# Patient Record
Sex: MALE | Race: WHITE | Employment: OTHER | URBAN - METROPOLITAN AREA
[De-identification: names, ages, dates, MRNs, and addresses within clinical notes are randomized per-mention and may not be internally consistent; named-entity substitution may affect disease eponyms.]

---

## 2017-01-01 ENCOUNTER — CARE COORDINATION (OUTPATIENT)
Dept: CASE MANAGEMENT | Age: 77
End: 2017-01-01

## 2017-01-01 ENCOUNTER — NURSE ONLY (OUTPATIENT)
Dept: INTERNAL MEDICINE CLINIC | Age: 77
End: 2017-01-01

## 2017-01-01 ENCOUNTER — OFFICE VISIT (OUTPATIENT)
Dept: ORTHOPEDIC SURGERY | Age: 77
End: 2017-01-01

## 2017-01-01 ENCOUNTER — OFFICE VISIT (OUTPATIENT)
Dept: INTERNAL MEDICINE CLINIC | Age: 77
End: 2017-01-01

## 2017-01-01 ENCOUNTER — TELEPHONE (OUTPATIENT)
Dept: PULMONOLOGY | Age: 77
End: 2017-01-01

## 2017-01-01 ENCOUNTER — HOSPITAL ENCOUNTER (OUTPATIENT)
Dept: PHYSICAL THERAPY | Age: 77
Discharge: HOME | End: 2017-12-28
Admitting: ORTHOPAEDIC SURGERY

## 2017-01-01 ENCOUNTER — HOSPITAL ENCOUNTER (OUTPATIENT)
Dept: PHYSICAL THERAPY | Age: 77
Discharge: HOME | End: 2017-12-26
Admitting: ORTHOPAEDIC SURGERY

## 2017-01-01 ENCOUNTER — HOSPITAL ENCOUNTER (OUTPATIENT)
Dept: OTHER | Age: 77
Discharge: HOME | End: 2017-11-30
Admitting: INTERNAL MEDICINE

## 2017-01-01 ENCOUNTER — HOSPITAL ENCOUNTER (OUTPATIENT)
Dept: OTHER | Age: 77
Discharge: HOME | End: 2017-12-31
Attending: INTERNAL MEDICINE | Admitting: INTERNAL MEDICINE

## 2017-01-01 ENCOUNTER — TELEPHONE (OUTPATIENT)
Dept: INTERNAL MEDICINE CLINIC | Age: 77
End: 2017-01-01

## 2017-01-01 ENCOUNTER — HOSPITAL ENCOUNTER (OUTPATIENT)
Dept: PHYSICAL THERAPY | Age: 77
Discharge: HOME | End: 2017-12-31
Admitting: ORTHOPAEDIC SURGERY

## 2017-01-01 VITALS
HEART RATE: 64 BPM | SYSTOLIC BLOOD PRESSURE: 142 MMHG | BODY MASS INDEX: 39.07 KG/M2 | HEIGHT: 70 IN | WEIGHT: 272.93 LBS | DIASTOLIC BLOOD PRESSURE: 58 MMHG

## 2017-01-01 VITALS
HEIGHT: 70 IN | HEART RATE: 70 BPM | DIASTOLIC BLOOD PRESSURE: 75 MMHG | BODY MASS INDEX: 37.8 KG/M2 | RESPIRATION RATE: 18 BRPM | WEIGHT: 264 LBS | SYSTOLIC BLOOD PRESSURE: 160 MMHG

## 2017-01-01 VITALS
DIASTOLIC BLOOD PRESSURE: 76 MMHG | HEART RATE: 74 BPM | SYSTOLIC BLOOD PRESSURE: 106 MMHG | WEIGHT: 273.37 LBS | BODY MASS INDEX: 39.14 KG/M2 | HEIGHT: 70 IN

## 2017-01-01 VITALS
HEART RATE: 59 BPM | DIASTOLIC BLOOD PRESSURE: 65 MMHG | SYSTOLIC BLOOD PRESSURE: 168 MMHG | BODY MASS INDEX: 39.08 KG/M2 | TEMPERATURE: 98 F | WEIGHT: 273 LBS | HEIGHT: 70 IN | RESPIRATION RATE: 20 BRPM

## 2017-01-01 VITALS
RESPIRATION RATE: 18 BRPM | SYSTOLIC BLOOD PRESSURE: 150 MMHG | HEART RATE: 70 BPM | DIASTOLIC BLOOD PRESSURE: 50 MMHG | HEIGHT: 70 IN

## 2017-01-01 VITALS — HEART RATE: 66 BPM | SYSTOLIC BLOOD PRESSURE: 165 MMHG | DIASTOLIC BLOOD PRESSURE: 55 MMHG

## 2017-01-01 DIAGNOSIS — D63.1 ANEMIA OF CHRONIC RENAL FAILURE, STAGE 3 (MODERATE) (HCC): ICD-10-CM

## 2017-01-01 DIAGNOSIS — D47.2 MGUS (MONOCLONAL GAMMOPATHY OF UNKNOWN SIGNIFICANCE): ICD-10-CM

## 2017-01-01 DIAGNOSIS — S72.002D FRACTURE, PROXIMAL FEMUR, LEFT, CLOSED, WITH ROUTINE HEALING, SUBSEQUENT ENCOUNTER: ICD-10-CM

## 2017-01-01 DIAGNOSIS — L57.0 ACTINIC KERATOSIS: Primary | ICD-10-CM

## 2017-01-01 DIAGNOSIS — G47.33 OSA (OBSTRUCTIVE SLEEP APNEA): ICD-10-CM

## 2017-01-01 DIAGNOSIS — N18.4 CHRONIC KIDNEY DISEASE (CKD), STAGE IV (SEVERE) (HCC): ICD-10-CM

## 2017-01-01 DIAGNOSIS — D63.8 ANEMIA IN OTHER CHRONIC DISEASES CLASSIFIED ELSEWHERE: ICD-10-CM

## 2017-01-01 DIAGNOSIS — S72.402D CLOSED FRACTURE OF DISTAL END OF LEFT FEMUR WITH ROUTINE HEALING, UNSPECIFIED FRACTURE MORPHOLOGY, SUBSEQUENT ENCOUNTER: Primary | ICD-10-CM

## 2017-01-01 DIAGNOSIS — S72.142A CLOSED INTERTROCHANTERIC FRACTURE OF HIP, LEFT, INITIAL ENCOUNTER (HCC): ICD-10-CM

## 2017-01-01 DIAGNOSIS — I25.10 CORONARY ARTERY DISEASE INVOLVING NATIVE CORONARY ARTERY OF NATIVE HEART WITHOUT ANGINA PECTORIS: ICD-10-CM

## 2017-01-01 DIAGNOSIS — K20.90 ESOPHAGITIS: ICD-10-CM

## 2017-01-01 DIAGNOSIS — M51.26 DISPLACEMENT OF LUMBAR INTERVERTEBRAL DISC WITHOUT MYELOPATHY: Chronic | ICD-10-CM

## 2017-01-01 DIAGNOSIS — N18.30 ANEMIA OF CHRONIC RENAL FAILURE, STAGE 3 (MODERATE) (HCC): ICD-10-CM

## 2017-01-01 DIAGNOSIS — M25.552 HIP PAIN, LEFT: Primary | ICD-10-CM

## 2017-01-01 DIAGNOSIS — I15.0 RENOVASCULAR HYPERTENSION: ICD-10-CM

## 2017-01-01 DIAGNOSIS — E11.29 TYPE 2 DIABETES MELLITUS WITH OTHER DIABETIC KIDNEY COMPLICATION, WITH LONG-TERM CURRENT USE OF INSULIN (HCC): ICD-10-CM

## 2017-01-01 DIAGNOSIS — I50.32 CHRONIC DIASTOLIC CONGESTIVE HEART FAILURE (HCC): ICD-10-CM

## 2017-01-01 DIAGNOSIS — Z79.4 TYPE 2 DIABETES MELLITUS WITH OTHER DIABETIC KIDNEY COMPLICATION, WITH LONG-TERM CURRENT USE OF INSULIN (HCC): ICD-10-CM

## 2017-01-01 DIAGNOSIS — M25.552 PAIN OF LEFT HIP JOINT: Primary | ICD-10-CM

## 2017-01-01 DIAGNOSIS — R53.82 CHRONIC FATIGUE: ICD-10-CM

## 2017-01-01 DIAGNOSIS — Z23 NEED FOR INFLUENZA VACCINATION: Primary | ICD-10-CM

## 2017-01-01 DIAGNOSIS — N18.30 CHRONIC KIDNEY DISEASE, STAGE III (MODERATE) (HCC): ICD-10-CM

## 2017-01-01 DIAGNOSIS — S72.402A CLOSED FRACTURE OF DISTAL END OF LEFT FEMUR, UNSPECIFIED FRACTURE MORPHOLOGY, INITIAL ENCOUNTER (HCC): Primary | ICD-10-CM

## 2017-01-01 DIAGNOSIS — E66.09 CLASS 2 OBESITY DUE TO EXCESS CALORIES WITH BODY MASS INDEX (BMI) OF 39.0 TO 39.9 IN ADULT, UNSPECIFIED WHETHER SERIOUS COMORBIDITY PRESENT: ICD-10-CM

## 2017-01-01 DIAGNOSIS — S72.402D CLOSED FRACTURE OF DISTAL END OF LEFT FEMUR WITH ROUTINE HEALING, UNSPECIFIED FRACTURE MORPHOLOGY, SUBSEQUENT ENCOUNTER: ICD-10-CM

## 2017-01-01 LAB
ABO/RH: NORMAL
ANTIBODY SCREEN: NORMAL
BLOOD BANK DISPENSE STATUS: NORMAL
BLOOD BANK DISPENSE STATUS: NORMAL
BLOOD BANK PRODUCT CODE: NORMAL
BLOOD BANK PRODUCT CODE: NORMAL
BPU ID: NORMAL
BPU ID: NORMAL
DESCRIPTION BLOOD BANK: NORMAL
DESCRIPTION BLOOD BANK: NORMAL

## 2017-01-01 PROCEDURE — G8484 FLU IMMUNIZE NO ADMIN: HCPCS | Performed by: INTERNAL MEDICINE

## 2017-01-01 PROCEDURE — 73502 X-RAY EXAM HIP UNI 2-3 VIEWS: CPT | Performed by: ORTHOPAEDIC SURGERY

## 2017-01-01 PROCEDURE — 99214 OFFICE O/P EST MOD 30 MIN: CPT | Performed by: INTERNAL MEDICINE

## 2017-01-01 PROCEDURE — 1111F DSCHRG MED/CURRENT MED MERGE: CPT | Performed by: PHYSICIAN ASSISTANT

## 2017-01-01 PROCEDURE — G0008 ADMIN INFLUENZA VIRUS VAC: HCPCS | Performed by: INTERNAL MEDICINE

## 2017-01-01 PROCEDURE — 4040F PNEUMOC VAC/ADMIN/RCVD: CPT | Performed by: INTERNAL MEDICINE

## 2017-01-01 PROCEDURE — G8417 CALC BMI ABV UP PARAM F/U: HCPCS | Performed by: PHYSICIAN ASSISTANT

## 2017-01-01 PROCEDURE — 99214 OFFICE O/P EST MOD 30 MIN: CPT | Performed by: PHYSICIAN ASSISTANT

## 2017-01-01 PROCEDURE — 73552 X-RAY EXAM OF FEMUR 2/>: CPT | Performed by: ORTHOPAEDIC SURGERY

## 2017-01-01 PROCEDURE — G8428 CUR MEDS NOT DOCUMENT: HCPCS | Performed by: PHYSICIAN ASSISTANT

## 2017-01-01 PROCEDURE — 99024 POSTOP FOLLOW-UP VISIT: CPT | Performed by: ORTHOPAEDIC SURGERY

## 2017-01-01 PROCEDURE — 1123F ACP DISCUSS/DSCN MKR DOCD: CPT | Performed by: INTERNAL MEDICINE

## 2017-01-01 PROCEDURE — 1123F ACP DISCUSS/DSCN MKR DOCD: CPT | Performed by: PHYSICIAN ASSISTANT

## 2017-01-01 PROCEDURE — G8598 ASA/ANTIPLAT THER USED: HCPCS | Performed by: INTERNAL MEDICINE

## 2017-01-01 PROCEDURE — 4040F PNEUMOC VAC/ADMIN/RCVD: CPT | Performed by: PHYSICIAN ASSISTANT

## 2017-01-01 PROCEDURE — G8484 FLU IMMUNIZE NO ADMIN: HCPCS | Performed by: PHYSICIAN ASSISTANT

## 2017-01-01 PROCEDURE — G8428 CUR MEDS NOT DOCUMENT: HCPCS | Performed by: INTERNAL MEDICINE

## 2017-01-01 PROCEDURE — 73560 X-RAY EXAM OF KNEE 1 OR 2: CPT | Performed by: ORTHOPAEDIC SURGERY

## 2017-01-01 PROCEDURE — 99212 OFFICE O/P EST SF 10 MIN: CPT | Performed by: INTERNAL MEDICINE

## 2017-01-01 PROCEDURE — G8417 CALC BMI ABV UP PARAM F/U: HCPCS | Performed by: INTERNAL MEDICINE

## 2017-01-01 PROCEDURE — G8598 ASA/ANTIPLAT THER USED: HCPCS | Performed by: PHYSICIAN ASSISTANT

## 2017-01-01 PROCEDURE — 1036F TOBACCO NON-USER: CPT | Performed by: PHYSICIAN ASSISTANT

## 2017-01-01 PROCEDURE — 1036F TOBACCO NON-USER: CPT | Performed by: INTERNAL MEDICINE

## 2017-01-01 PROCEDURE — 90662 IIV NO PRSV INCREASED AG IM: CPT | Performed by: INTERNAL MEDICINE

## 2017-01-01 RX ORDER — 0.9 % SODIUM CHLORIDE 0.9 %
250 INTRAVENOUS SOLUTION INTRAVENOUS ONCE
Status: COMPLETED | OUTPATIENT
Start: 2017-01-01 | End: 2017-01-01

## 2017-01-01 RX ORDER — BLOOD-GLUCOSE METER
EACH MISCELLANEOUS
Qty: 1 KIT | Refills: 0 | Status: SHIPPED | OUTPATIENT
Start: 2017-01-01

## 2017-01-01 RX ORDER — HYDRALAZINE HYDROCHLORIDE 100 MG/1
TABLET, FILM COATED ORAL
Qty: 270 TABLET | Refills: 0 | Status: SHIPPED | OUTPATIENT
Start: 2017-01-01 | End: 2018-01-01 | Stop reason: SDUPTHER

## 2017-01-01 RX ORDER — ALLOPURINOL 100 MG/1
TABLET ORAL
Qty: 90 TABLET | Refills: 0 | Status: SHIPPED | OUTPATIENT
Start: 2017-01-01 | End: 2018-01-01 | Stop reason: SDUPTHER

## 2017-01-01 RX ORDER — DIPHENHYDRAMINE HCL 25 MG
25 TABLET ORAL ONCE
Status: COMPLETED | OUTPATIENT
Start: 2017-01-01 | End: 2017-01-01

## 2017-01-01 RX ORDER — METOLAZONE 2.5 MG/1
2.5 TABLET ORAL
COMMUNITY

## 2017-01-01 RX ORDER — HYDROCODONE BITARTRATE AND ACETAMINOPHEN 5; 325 MG/1; MG/1
1 TABLET ORAL EVERY 6 HOURS PRN
Qty: 120 TABLET | Refills: 0 | Status: ON HOLD | OUTPATIENT
Start: 2017-01-01 | End: 2017-01-01 | Stop reason: HOSPADM

## 2017-01-01 RX ORDER — ISOSORBIDE MONONITRATE 120 MG/1
120 TABLET, EXTENDED RELEASE ORAL DAILY
Qty: 30 TABLET | Refills: 2 | Status: SHIPPED | OUTPATIENT
Start: 2017-01-01

## 2017-01-01 RX ORDER — BENAZEPRIL HYDROCHLORIDE 40 MG/1
TABLET, FILM COATED ORAL
Qty: 90 TABLET | Refills: 0 | Status: ON HOLD | OUTPATIENT
Start: 2017-01-01 | End: 2017-01-01 | Stop reason: HOSPADM

## 2017-01-01 RX ORDER — HUMAN INSULIN 100 [USP'U]/ML
INJECTION, SUSPENSION SUBCUTANEOUS
Qty: 30 ML | Refills: 3 | Status: SHIPPED | OUTPATIENT
Start: 2017-01-01 | End: 2018-01-01 | Stop reason: SDUPTHER

## 2017-01-01 RX ORDER — SODIUM CHLORIDE 0.9 % (FLUSH) 0.9 %
10 SYRINGE (ML) INJECTION PRN
Status: DISCONTINUED | OUTPATIENT
Start: 2017-01-01 | End: 2017-01-01 | Stop reason: CLARIF

## 2017-01-01 RX ORDER — GABAPENTIN 300 MG/1
CAPSULE ORAL
Qty: 540 CAPSULE | Refills: 0 | Status: SHIPPED | OUTPATIENT
Start: 2017-01-01 | End: 2018-01-01 | Stop reason: SDUPTHER

## 2017-01-01 RX ORDER — GLIMEPIRIDE 2 MG/1
TABLET ORAL
Qty: 360 TABLET | Refills: 0 | Status: SHIPPED | OUTPATIENT
Start: 2017-01-01 | End: 2018-01-01 | Stop reason: SDUPTHER

## 2017-01-01 RX ORDER — NIFEDIPINE 60 MG/1
60 TABLET, FILM COATED, EXTENDED RELEASE ORAL DAILY
Qty: 30 TABLET | Refills: 2 | Status: SHIPPED | OUTPATIENT
Start: 2017-01-01 | End: 2018-01-01 | Stop reason: SDUPTHER

## 2017-01-01 RX ORDER — TORSEMIDE 20 MG/1
TABLET ORAL
Qty: 90 TABLET | Refills: 0 | Status: ON HOLD | OUTPATIENT
Start: 2017-01-01 | End: 2017-01-01 | Stop reason: HOSPADM

## 2017-01-01 RX ORDER — ACETAMINOPHEN 325 MG/1
650 TABLET ORAL ONCE
Status: DISCONTINUED | OUTPATIENT
Start: 2017-01-01 | End: 2017-01-01 | Stop reason: HOSPADM

## 2017-01-01 RX ORDER — HYDROCODONE BITARTRATE AND ACETAMINOPHEN 5; 325 MG/1; MG/1
1 TABLET ORAL EVERY 6 HOURS PRN
Qty: 28 TABLET | Refills: 0 | Status: SHIPPED | OUTPATIENT
Start: 2017-01-01 | End: 2018-01-01 | Stop reason: SDUPTHER

## 2017-01-01 RX ORDER — 0.9 % SODIUM CHLORIDE 0.9 %
250 INTRAVENOUS SOLUTION INTRAVENOUS ONCE
Status: DISCONTINUED | OUTPATIENT
Start: 2017-01-01 | End: 2017-01-01 | Stop reason: CLARIF

## 2017-01-01 RX ORDER — HYDROCODONE BITARTRATE AND ACETAMINOPHEN 5; 325 MG/1; MG/1
1 TABLET ORAL EVERY 6 HOURS PRN
Qty: 28 TABLET | Refills: 0 | Status: SHIPPED | OUTPATIENT
Start: 2017-01-01 | End: 2017-01-01 | Stop reason: SDUPTHER

## 2017-01-01 RX ORDER — BENAZEPRIL HYDROCHLORIDE 40 MG/1
TABLET, FILM COATED ORAL
Qty: 90 TABLET | Refills: 0 | Status: SHIPPED | OUTPATIENT
Start: 2017-01-01 | End: 2017-01-01

## 2017-01-01 RX ORDER — SODIUM CHLORIDE 0.9 % (FLUSH) 0.9 %
10 SYRINGE (ML) INJECTION PRN
Status: DISCONTINUED | OUTPATIENT
Start: 2017-01-01 | End: 2017-01-01 | Stop reason: HOSPADM

## 2017-01-01 RX ORDER — ALLOPURINOL 100 MG/1
TABLET ORAL
Qty: 90 TABLET | Refills: 0 | Status: SHIPPED | OUTPATIENT
Start: 2017-01-01 | End: 2017-01-01 | Stop reason: SDUPTHER

## 2017-01-01 RX ORDER — HYDRALAZINE HYDROCHLORIDE 100 MG/1
TABLET, FILM COATED ORAL
Qty: 270 TABLET | Refills: 0 | Status: SHIPPED | OUTPATIENT
Start: 2017-01-01 | End: 2017-01-01 | Stop reason: SDUPTHER

## 2017-01-01 RX ORDER — LANSOPRAZOLE 15 MG/1
15 CAPSULE, DELAYED RELEASE ORAL DAILY
Qty: 30 CAPSULE | Refills: 1 | Status: SHIPPED | OUTPATIENT
Start: 2017-01-01 | End: 2017-01-01

## 2017-01-01 RX ORDER — LANCETS
EACH MISCELLANEOUS
Qty: 100 EACH | Refills: 3 | Status: SHIPPED | OUTPATIENT
Start: 2017-01-01

## 2017-01-01 RX ORDER — TORSEMIDE 20 MG/1
TABLET ORAL
Qty: 90 TABLET | Refills: 0 | Status: SHIPPED | OUTPATIENT
Start: 2017-01-01

## 2017-01-01 RX ORDER — OMEPRAZOLE 20 MG/1
20 CAPSULE, DELAYED RELEASE ORAL DAILY
COMMUNITY

## 2017-01-01 RX ORDER — TRAMADOL HYDROCHLORIDE 50 MG/1
50 TABLET ORAL EVERY 8 HOURS PRN
Qty: 90 TABLET | Refills: 0 | Status: SHIPPED | OUTPATIENT
Start: 2017-01-01 | End: 2017-01-01

## 2017-01-01 RX ADMIN — Medication 250 ML: at 07:41

## 2017-01-01 RX ADMIN — Medication 25 MG: at 07:38

## 2017-01-01 ASSESSMENT — ENCOUNTER SYMPTOMS
SHORTNESS OF BREATH: 0
COLOR CHANGE: 0
BACK PAIN: 1
CHEST TIGHTNESS: 0
COUGH: 0
RHINORRHEA: 0

## 2017-01-19 RX ORDER — TERAZOSIN 2 MG/1
2 CAPSULE ORAL NIGHTLY
Qty: 7 CAPSULE | Refills: 0 | Status: SHIPPED | OUTPATIENT
Start: 2017-01-19

## 2017-01-20 RX ORDER — GABAPENTIN 300 MG/1
CAPSULE ORAL
Qty: 540 CAPSULE | Refills: 0 | Status: SHIPPED | OUTPATIENT
Start: 2017-01-20 | End: 2017-05-06 | Stop reason: SDUPTHER

## 2017-01-20 RX ORDER — GLIMEPIRIDE 2 MG/1
TABLET ORAL
Qty: 360 TABLET | Refills: 0 | Status: SHIPPED | OUTPATIENT
Start: 2017-01-20 | End: 2017-05-06 | Stop reason: SDUPTHER

## 2017-01-20 RX ORDER — HYDRALAZINE HYDROCHLORIDE 100 MG/1
TABLET, FILM COATED ORAL
Qty: 90 TABLET | Refills: 0 | Status: SHIPPED | OUTPATIENT
Start: 2017-01-20 | End: 2017-03-06 | Stop reason: SDUPTHER

## 2017-01-20 RX ORDER — BENAZEPRIL HYDROCHLORIDE 40 MG/1
TABLET, FILM COATED ORAL
Qty: 90 TABLET | Refills: 0 | Status: SHIPPED | OUTPATIENT
Start: 2017-01-20 | End: 2017-05-06 | Stop reason: SDUPTHER

## 2017-01-20 RX ORDER — TORSEMIDE 20 MG/1
TABLET ORAL
Qty: 90 TABLET | Refills: 0 | Status: SHIPPED | OUTPATIENT
Start: 2017-01-20 | End: 2017-03-24 | Stop reason: SDUPTHER

## 2017-01-31 RX ORDER — HYDROCODONE BITARTRATE AND ACETAMINOPHEN 7.5; 325 MG/1; MG/1
1 TABLET ORAL EVERY 6 HOURS PRN
Qty: 120 TABLET | Refills: 0 | Status: SHIPPED | OUTPATIENT
Start: 2017-01-31 | End: 2017-03-03 | Stop reason: SDUPTHER

## 2017-02-14 ENCOUNTER — TELEPHONE (OUTPATIENT)
Dept: INTERNAL MEDICINE CLINIC | Age: 77
End: 2017-02-14

## 2017-02-14 ENCOUNTER — OFFICE VISIT (OUTPATIENT)
Dept: INTERNAL MEDICINE CLINIC | Age: 77
End: 2017-02-14

## 2017-02-14 VITALS
RESPIRATION RATE: 18 BRPM | HEART RATE: 70 BPM | HEIGHT: 70 IN | SYSTOLIC BLOOD PRESSURE: 155 MMHG | WEIGHT: 274 LBS | DIASTOLIC BLOOD PRESSURE: 60 MMHG | BODY MASS INDEX: 39.22 KG/M2

## 2017-02-14 DIAGNOSIS — R68.2 DRY MOUTH: Primary | ICD-10-CM

## 2017-02-14 DIAGNOSIS — R05.9 COUGH: ICD-10-CM

## 2017-02-14 PROCEDURE — 99213 OFFICE O/P EST LOW 20 MIN: CPT | Performed by: INTERNAL MEDICINE

## 2017-02-14 ASSESSMENT — ENCOUNTER SYMPTOMS
COUGH: 1
RHINORRHEA: 0
COLOR CHANGE: 0
SHORTNESS OF BREATH: 0
BACK PAIN: 1
CHEST TIGHTNESS: 0

## 2017-03-03 RX ORDER — HYDROCODONE BITARTRATE AND ACETAMINOPHEN 7.5; 325 MG/1; MG/1
1 TABLET ORAL EVERY 6 HOURS PRN
Qty: 120 TABLET | Refills: 0 | Status: SHIPPED | OUTPATIENT
Start: 2017-03-03 | End: 2017-04-03 | Stop reason: SDUPTHER

## 2017-03-06 RX ORDER — HYDRALAZINE HYDROCHLORIDE 100 MG/1
TABLET, FILM COATED ORAL
Qty: 90 TABLET | Refills: 0 | Status: SHIPPED | OUTPATIENT
Start: 2017-03-06 | End: 2017-03-15 | Stop reason: SDUPTHER

## 2017-03-13 ENCOUNTER — OFFICE VISIT (OUTPATIENT)
Dept: INTERNAL MEDICINE CLINIC | Age: 77
End: 2017-03-13

## 2017-03-13 VITALS
HEART RATE: 60 BPM | RESPIRATION RATE: 18 BRPM | DIASTOLIC BLOOD PRESSURE: 75 MMHG | HEIGHT: 70 IN | SYSTOLIC BLOOD PRESSURE: 160 MMHG | BODY MASS INDEX: 38.37 KG/M2 | WEIGHT: 268 LBS

## 2017-03-13 DIAGNOSIS — Z79.4 TYPE 2 DIABETES MELLITUS WITH OTHER DIABETIC KIDNEY COMPLICATION, WITH LONG-TERM CURRENT USE OF INSULIN (HCC): Primary | ICD-10-CM

## 2017-03-13 DIAGNOSIS — G47.33 OSA (OBSTRUCTIVE SLEEP APNEA): ICD-10-CM

## 2017-03-13 DIAGNOSIS — M47.817 LUMBOSACRAL SPONDYLOSIS WITHOUT MYELOPATHY: Chronic | ICD-10-CM

## 2017-03-13 DIAGNOSIS — E11.29 TYPE 2 DIABETES MELLITUS WITH OTHER DIABETIC KIDNEY COMPLICATION, WITH LONG-TERM CURRENT USE OF INSULIN (HCC): Primary | ICD-10-CM

## 2017-03-13 DIAGNOSIS — K21.9 GASTROESOPHAGEAL REFLUX DISEASE WITHOUT ESOPHAGITIS: ICD-10-CM

## 2017-03-13 DIAGNOSIS — E11.40 DIABETIC NEUROPATHY WITH NEUROLOGIC COMPLICATION (HCC): ICD-10-CM

## 2017-03-13 DIAGNOSIS — I10 ESSENTIAL HYPERTENSION: ICD-10-CM

## 2017-03-13 DIAGNOSIS — E11.49 DIABETIC NEUROPATHY WITH NEUROLOGIC COMPLICATION (HCC): ICD-10-CM

## 2017-03-13 DIAGNOSIS — I25.10 CORONARY ARTERY DISEASE INVOLVING NATIVE HEART WITHOUT ANGINA PECTORIS, UNSPECIFIED VESSEL OR LESION TYPE: ICD-10-CM

## 2017-03-13 PROCEDURE — 99214 OFFICE O/P EST MOD 30 MIN: CPT | Performed by: INTERNAL MEDICINE

## 2017-03-13 ASSESSMENT — ENCOUNTER SYMPTOMS
RHINORRHEA: 0
COUGH: 0
COLOR CHANGE: 0
BACK PAIN: 1
CHEST TIGHTNESS: 0
SHORTNESS OF BREATH: 0

## 2017-03-15 RX ORDER — HYDRALAZINE HYDROCHLORIDE 100 MG/1
100 TABLET, FILM COATED ORAL 3 TIMES DAILY
Qty: 270 TABLET | Refills: 0 | Status: SHIPPED | OUTPATIENT
Start: 2017-03-15 | End: 2017-06-01 | Stop reason: SDUPTHER

## 2017-03-17 ENCOUNTER — TELEPHONE (OUTPATIENT)
Dept: PULMONOLOGY | Age: 77
End: 2017-03-17

## 2017-03-17 RX ORDER — SYRINGE-NEEDLE,INSULIN,0.5 ML 31 GX5/16"
SYRINGE, EMPTY DISPOSABLE MISCELLANEOUS
Qty: 270 EACH | Refills: 5 | Status: SHIPPED | OUTPATIENT
Start: 2017-03-17 | End: 2018-01-01 | Stop reason: SDUPTHER

## 2017-03-21 ENCOUNTER — TELEPHONE (OUTPATIENT)
Dept: INTERNAL MEDICINE CLINIC | Age: 77
End: 2017-03-21

## 2017-03-21 ENCOUNTER — OFFICE VISIT (OUTPATIENT)
Dept: PULMONOLOGY | Age: 77
End: 2017-03-21

## 2017-03-21 ENCOUNTER — TELEPHONE (OUTPATIENT)
Dept: PULMONOLOGY | Age: 77
End: 2017-03-21

## 2017-03-21 VITALS
OXYGEN SATURATION: 97 % | HEART RATE: 67 BPM | RESPIRATION RATE: 22 BRPM | DIASTOLIC BLOOD PRESSURE: 66 MMHG | BODY MASS INDEX: 38.02 KG/M2 | TEMPERATURE: 98.1 F | HEIGHT: 70 IN | SYSTOLIC BLOOD PRESSURE: 174 MMHG | WEIGHT: 265.6 LBS

## 2017-03-21 DIAGNOSIS — G47.33 OSA (OBSTRUCTIVE SLEEP APNEA): ICD-10-CM

## 2017-03-21 DIAGNOSIS — J47.9 BRONCHIECTASIS WITHOUT COMPLICATION (HCC): ICD-10-CM

## 2017-03-21 DIAGNOSIS — R93.89 ABNORMAL CT SCAN, CHEST: Primary | ICD-10-CM

## 2017-03-21 PROCEDURE — 99214 OFFICE O/P EST MOD 30 MIN: CPT | Performed by: INTERNAL MEDICINE

## 2017-03-21 ASSESSMENT — SLEEP AND FATIGUE QUESTIONNAIRES
HOW LIKELY ARE YOU TO NOD OFF OR FALL ASLEEP WHILE LYING DOWN TO REST IN THE AFTERNOON WHEN CIRCUMSTANCES PERMIT: 2
HOW LIKELY ARE YOU TO NOD OFF OR FALL ASLEEP WHILE SITTING AND READING: 0
HOW LIKELY ARE YOU TO NOD OFF OR FALL ASLEEP WHILE SITTING AND TALKING TO SOMEONE: 0
HOW LIKELY ARE YOU TO NOD OFF OR FALL ASLEEP WHILE WATCHING TV: 0
HOW LIKELY ARE YOU TO NOD OFF OR FALL ASLEEP WHILE SITTING INACTIVE IN A PUBLIC PLACE: 0
ESS TOTAL SCORE: 2
HOW LIKELY ARE YOU TO NOD OFF OR FALL ASLEEP IN A CAR, WHILE STOPPED FOR A FEW MINUTES IN TRAFFIC: 0
NECK CIRCUMFERENCE (INCHES): 17
HOW LIKELY ARE YOU TO NOD OFF OR FALL ASLEEP WHILE SITTING QUIETLY AFTER LUNCH WITHOUT ALCOHOL: 0
HOW LIKELY ARE YOU TO NOD OFF OR FALL ASLEEP WHEN YOU ARE A PASSENGER IN A CAR FOR AN HOUR WITHOUT A BREAK: 0

## 2017-03-27 RX ORDER — ALLOPURINOL 100 MG/1
TABLET ORAL
Qty: 90 TABLET | Refills: 0 | Status: SHIPPED | OUTPATIENT
Start: 2017-03-27 | End: 2017-05-31 | Stop reason: SDUPTHER

## 2017-03-27 RX ORDER — TORSEMIDE 20 MG/1
TABLET ORAL
Qty: 90 TABLET | Refills: 0 | Status: SHIPPED | OUTPATIENT
Start: 2017-03-27 | End: 2017-04-03 | Stop reason: SDUPTHER

## 2017-04-03 ENCOUNTER — TELEPHONE (OUTPATIENT)
Dept: INTERNAL MEDICINE CLINIC | Age: 77
End: 2017-04-03

## 2017-04-03 RX ORDER — TORSEMIDE 20 MG/1
TABLET ORAL
Qty: 14 TABLET | Refills: 0 | Status: SHIPPED | OUTPATIENT
Start: 2017-04-03 | End: 2017-05-25 | Stop reason: SDUPTHER

## 2017-04-04 RX ORDER — HYDROCODONE BITARTRATE AND ACETAMINOPHEN 7.5; 325 MG/1; MG/1
1 TABLET ORAL EVERY 6 HOURS PRN
Qty: 120 TABLET | Refills: 0 | Status: SHIPPED | OUTPATIENT
Start: 2017-04-04 | End: 2017-05-06

## 2017-04-05 PROBLEM — D63.1 ANEMIA OF CHRONIC RENAL FAILURE: Status: ACTIVE | Noted: 2017-04-05

## 2017-04-05 PROBLEM — N18.9 ANEMIA OF CHRONIC RENAL FAILURE: Status: ACTIVE | Noted: 2017-04-05

## 2017-04-07 ENCOUNTER — HOSPITAL ENCOUNTER (OUTPATIENT)
Dept: CT IMAGING | Age: 77
Discharge: HOME | End: 2017-04-07
Attending: INTERNAL MEDICINE | Admitting: INTERNAL MEDICINE

## 2017-04-07 ENCOUNTER — TELEPHONE (OUTPATIENT)
Dept: PULMONOLOGY | Age: 77
End: 2017-04-07

## 2017-04-07 DIAGNOSIS — R93.89 ABNORMAL FINDINGS ON DIAGNOSTIC IMAGING OF OTHER SPECIFIED BODY STRUCTURES: ICD-10-CM

## 2017-04-07 DIAGNOSIS — R93.89 ABNORMAL CT SCAN, CHEST: ICD-10-CM

## 2017-04-07 DIAGNOSIS — R93.89 ABNORMAL CT SCAN: Primary | ICD-10-CM

## 2017-04-24 ENCOUNTER — TELEPHONE (OUTPATIENT)
Dept: PULMONOLOGY | Age: 77
End: 2017-04-24

## 2017-05-08 ENCOUNTER — CARE COORDINATION (OUTPATIENT)
Dept: CARE COORDINATION | Age: 77
End: 2017-05-08

## 2017-05-08 RX ORDER — GLIMEPIRIDE 2 MG/1
TABLET ORAL
Qty: 360 TABLET | Refills: 0 | Status: SHIPPED | OUTPATIENT
Start: 2017-05-08 | End: 2017-01-01 | Stop reason: SDUPTHER

## 2017-05-08 RX ORDER — BENAZEPRIL HYDROCHLORIDE 40 MG/1
TABLET, FILM COATED ORAL
Qty: 90 TABLET | Refills: 0 | Status: SHIPPED | OUTPATIENT
Start: 2017-05-08 | End: 2017-01-01 | Stop reason: SDUPTHER

## 2017-05-08 RX ORDER — GABAPENTIN 300 MG/1
CAPSULE ORAL
Qty: 540 CAPSULE | Refills: 0 | Status: SHIPPED | OUTPATIENT
Start: 2017-05-08 | End: 2017-01-01 | Stop reason: SDUPTHER

## 2017-05-09 ENCOUNTER — TELEPHONE (OUTPATIENT)
Dept: INTERNAL MEDICINE CLINIC | Age: 77
End: 2017-05-09

## 2017-05-11 RX ORDER — HYDROCODONE BITARTRATE AND ACETAMINOPHEN 5; 325 MG/1; MG/1
1 TABLET ORAL EVERY 6 HOURS PRN
Qty: 120 TABLET | Refills: 0 | Status: SHIPPED | OUTPATIENT
Start: 2017-05-11 | End: 2017-05-11 | Stop reason: CLARIF

## 2017-05-11 RX ORDER — HYDROCODONE BITARTRATE AND ACETAMINOPHEN 5; 325 MG/1; MG/1
1 TABLET ORAL EVERY 6 HOURS PRN
Qty: 120 TABLET | Refills: 0 | Status: SHIPPED | OUTPATIENT
Start: 2017-05-11 | End: 2017-06-22 | Stop reason: SDUPTHER

## 2017-05-25 RX ORDER — TORSEMIDE 20 MG/1
TABLET ORAL
Qty: 90 TABLET | Refills: 0 | Status: SHIPPED | OUTPATIENT
Start: 2017-05-25 | End: 2017-01-01 | Stop reason: SDUPTHER

## 2017-06-01 RX ORDER — HYDRALAZINE HYDROCHLORIDE 100 MG/1
TABLET, FILM COATED ORAL
Qty: 270 TABLET | Refills: 0 | Status: SHIPPED | OUTPATIENT
Start: 2017-06-01 | End: 2017-01-01 | Stop reason: SDUPTHER

## 2017-06-01 RX ORDER — ALLOPURINOL 100 MG/1
TABLET ORAL
Qty: 90 TABLET | Refills: 0 | Status: SHIPPED | OUTPATIENT
Start: 2017-06-01 | End: 2017-01-01 | Stop reason: SDUPTHER

## 2017-06-02 ENCOUNTER — OFFICE VISIT (OUTPATIENT)
Dept: PULMONOLOGY | Age: 77
End: 2017-06-02

## 2017-06-02 ENCOUNTER — TELEPHONE (OUTPATIENT)
Dept: PULMONOLOGY | Age: 77
End: 2017-06-02

## 2017-06-02 VITALS
RESPIRATION RATE: 16 BRPM | TEMPERATURE: 98.7 F | BODY MASS INDEX: 38.8 KG/M2 | SYSTOLIC BLOOD PRESSURE: 164 MMHG | WEIGHT: 271 LBS | OXYGEN SATURATION: 96 % | DIASTOLIC BLOOD PRESSURE: 62 MMHG | HEIGHT: 70 IN | HEART RATE: 61 BPM

## 2017-06-02 DIAGNOSIS — I10 ESSENTIAL HYPERTENSION: ICD-10-CM

## 2017-06-02 DIAGNOSIS — E66.9 OBESITY (BMI 30-39.9): ICD-10-CM

## 2017-06-02 DIAGNOSIS — G47.33 OSA (OBSTRUCTIVE SLEEP APNEA): Primary | ICD-10-CM

## 2017-06-02 PROCEDURE — 99213 OFFICE O/P EST LOW 20 MIN: CPT | Performed by: NURSE PRACTITIONER

## 2017-06-02 ASSESSMENT — SLEEP AND FATIGUE QUESTIONNAIRES
HOW LIKELY ARE YOU TO NOD OFF OR FALL ASLEEP WHILE LYING DOWN TO REST IN THE AFTERNOON WHEN CIRCUMSTANCES PERMIT: 3
HOW LIKELY ARE YOU TO NOD OFF OR FALL ASLEEP WHILE SITTING QUIETLY AFTER LUNCH WITHOUT ALCOHOL: 0
ESS TOTAL SCORE: 5
HOW LIKELY ARE YOU TO NOD OFF OR FALL ASLEEP WHEN YOU ARE A PASSENGER IN A CAR FOR AN HOUR WITHOUT A BREAK: 0
HOW LIKELY ARE YOU TO NOD OFF OR FALL ASLEEP WHILE SITTING AND TALKING TO SOMEONE: 0
HOW LIKELY ARE YOU TO NOD OFF OR FALL ASLEEP WHILE SITTING AND READING: 1
HOW LIKELY ARE YOU TO NOD OFF OR FALL ASLEEP WHILE WATCHING TV: 1
HOW LIKELY ARE YOU TO NOD OFF OR FALL ASLEEP WHILE SITTING INACTIVE IN A PUBLIC PLACE: 0
NECK CIRCUMFERENCE (INCHES): 18
HOW LIKELY ARE YOU TO NOD OFF OR FALL ASLEEP IN A CAR, WHILE STOPPED FOR A FEW MINUTES IN TRAFFIC: 0

## 2017-06-12 ENCOUNTER — OFFICE VISIT (OUTPATIENT)
Dept: INTERNAL MEDICINE CLINIC | Age: 77
End: 2017-06-12

## 2017-06-12 VITALS
HEART RATE: 60 BPM | SYSTOLIC BLOOD PRESSURE: 160 MMHG | BODY MASS INDEX: 37.94 KG/M2 | RESPIRATION RATE: 18 BRPM | HEIGHT: 70 IN | DIASTOLIC BLOOD PRESSURE: 50 MMHG | WEIGHT: 265 LBS

## 2017-06-12 DIAGNOSIS — I25.10 CORONARY ARTERY DISEASE INVOLVING NATIVE HEART WITHOUT ANGINA PECTORIS, UNSPECIFIED VESSEL OR LESION TYPE: ICD-10-CM

## 2017-06-12 DIAGNOSIS — N18.30 CHRONIC KIDNEY DISEASE, STAGE III (MODERATE) (HCC): ICD-10-CM

## 2017-06-12 DIAGNOSIS — I15.9 SECONDARY HYPERTENSION: ICD-10-CM

## 2017-06-12 DIAGNOSIS — M48.061 SPINAL STENOSIS, LUMBAR REGION, WITHOUT NEUROGENIC CLAUDICATION: Chronic | ICD-10-CM

## 2017-06-12 DIAGNOSIS — K21.9 GASTROESOPHAGEAL REFLUX DISEASE WITHOUT ESOPHAGITIS: ICD-10-CM

## 2017-06-12 PROCEDURE — 99214 OFFICE O/P EST MOD 30 MIN: CPT | Performed by: INTERNAL MEDICINE

## 2017-06-12 ASSESSMENT — ENCOUNTER SYMPTOMS
RHINORRHEA: 0
COLOR CHANGE: 0
CHEST TIGHTNESS: 0
SHORTNESS OF BREATH: 0
BACK PAIN: 1
COUGH: 0

## 2017-06-13 DIAGNOSIS — R97.20 ELEVATED PSA: Primary | ICD-10-CM

## 2017-06-13 DIAGNOSIS — R97.20 ELEVATED PSA: ICD-10-CM

## 2017-06-13 LAB
ESTIMATED AVERAGE GLUCOSE: 154.2 MG/DL
HBA1C MFR BLD: 7 %
PROSTATE SPECIFIC ANTIGEN: 0.84 NG/ML (ref 0–4)

## 2017-06-22 RX ORDER — HYDROCODONE BITARTRATE AND ACETAMINOPHEN 5; 325 MG/1; MG/1
1 TABLET ORAL EVERY 6 HOURS PRN
Qty: 120 TABLET | Refills: 0 | Status: SHIPPED | OUTPATIENT
Start: 2017-06-22 | End: 2017-07-31 | Stop reason: SDUPTHER

## 2017-06-29 ENCOUNTER — CARE COORDINATION (OUTPATIENT)
Dept: CARE COORDINATION | Age: 77
End: 2017-06-29

## 2017-07-17 ENCOUNTER — TELEPHONE (OUTPATIENT)
Dept: INTERNAL MEDICINE CLINIC | Age: 77
End: 2017-07-17

## 2017-07-31 RX ORDER — HYDROCODONE BITARTRATE AND ACETAMINOPHEN 5; 325 MG/1; MG/1
1 TABLET ORAL EVERY 6 HOURS PRN
Qty: 120 TABLET | Refills: 0 | Status: SHIPPED | OUTPATIENT
Start: 2017-07-31 | End: 2017-01-01 | Stop reason: SDUPTHER

## 2017-10-09 PROBLEM — S72.009A HIP FRACTURE (HCC): Status: ACTIVE | Noted: 2017-01-01

## 2017-10-11 NOTE — TELEPHONE ENCOUNTER
Patients spouse cancelled appointment for 6 month CT/PFT fua  on 10/13/17 with Major     Reason: Patient IP at [facility], broke his femor    Patient did not reschedule appointment. Last OV   Assessment: 6/2/17      · Severe CAROLINA-compliant per patient report  · Obesity  · Pulmonary nodules followed by Dr. Misael Fox  · Hypertensionblood pressure elevated in office today        Plan:       -Patient to bring CPAP to office for compliance download and mask fitting with RT. States has appointment in 2 weeks with PCP and will bring CPAP after that appointment. - Advised to use CPAP 6-8 hrs at night and during naps. - Replacement of mask, tubing, head straps every 3-6 months or sooner if damaged. - Patient instructed to contact [employer] for any mask, tubing or machine trouble shooting if problems arise.  - Sleep hygiene  - Avoid sedatives, alcohol and caffeinated drinks at bed time. - Patient counseled to never drive or operate heavy machinery while fatigue, drowsy or sleepy.    - Weight loss is recommended as a long-term intervention.    - Complications of CAROLINA if not treated were discussed with patient patient, including: systemic hypertension, pulmonary hypertension, cardiovascular morbidities, car accidents and all cause mortality.  -Follow-up with PCP for elevated blood pressure     Follow-up sleep one year, sooner if needed  Follow-up with Dr. Ruth Lee as scheduled

## 2017-10-23 NOTE — TELEPHONE ENCOUNTER
Discharge to [facility] d/t broken leg. OK to wait til discharged from nursing home?

## 2017-10-26 NOTE — PROGRESS NOTES
Subjective: Patient states that he is here for follow-up of his left intertrochanteric fracture IM nailing with cerclage wiring 10/9/17. Serum with his wife and states he is staying at the the [facility] nursing home. This pain is decreasing but it still there. Objective: Physical exam shows incisions look good no evidence of infection sensations intact to over 5 strength into hip flexion to plus over 5 in the knee, negative logroll possibility  Imagin views of the left hip shows IM nail in good position. He has cerclage wires distal and has a supra condylar femur plate from previous no change in position   Assessment and plan: This patient is doing well. He'll continue nonweightbearing and follow up with me in 4 weeks.   Repeat x-rays of the hip and knee should be obtained

## 2017-11-02 NOTE — CARE COORDINATION
Called skilled nursing facility [facility] for a patient update. Nurse Radha Tiwari reports patient has been experiencing acid reflux yesterday, SOB, O2 sats 86%, 91% with deep breathing, place on 2L, no chest pain, VSS. Today feeling better, will take off O2. Pt without Carafate on admission for 4 days d/t a pharmacy issue. Now on Carafate 10mls qid and Omeprazole 20mg. NP will be in to see patient. This nurse suggested maybe an increase in meds until condition calms. LVM for therapy to return call, contact information provided. Post acute transition coordinator will continue to follow.  Jen Nieto, TONNYN  [facility] Transition Coordinator  [phone]

## 2017-11-02 NOTE — CARE COORDINATION
Received a call back from Archana in therapy. Pt is SBA using a leg  for bed mobility, mod-max assist for transfers, NWB LLE. ADL's grooming set up, SBA upper body, max assist lower body dressing, toileting max assist x2, bathing SBA-max assist. Family supportive. Discharge TBD. TONNY HernándezN RN  Care Transition Coordinator  [phone]

## 2017-11-10 NOTE — TELEPHONE ENCOUNTER
Pt returned call to office and was scheduled for 1/2/18. Pt states that if he is not out of the nursing [facility] by then, he will call to cancel the appt.

## 2017-11-17 NOTE — PROGRESS NOTES
distress. Alert. Eyes: PERRL. No sclera icterus. No conjunctival injection. ENT: No discharge. Pharynx clear. Neck: No JVD. Trachea midline. Resp: No accessory muscle use. No crackles. No wheezes. No rhonchi. CV: Regular rate. Regular rhythm. No murmur. No rub. +1 BLE edema. GI: Non-tender. Non-distended. Normal bowel sounds. Skin: Warm and dry. No nodule on exposed extremities. No rash on exposed extremities. M/S: No cyanosis. No joint deformity. No clubbing. Neuro: Awake. Grossly nonfocal    Psych: Oriented x 3. No anxiety or agitation. Initial post-discharge communication occurred between nurse care coordinator and NH staff on 11/2/17- see documentation in chart: telephone encounter. Assessment/Plan:  Cherelle Larios was seen today for follow-up from hospital.    Diagnoses and all orders for this visit:    Closed fracture of distal end of left femur with routine healing, unspecified fracture morphology, subsequent encounter  - still non weight bearing. He has F/u with ortho next week. Home health care and wife helping at home    Renovascular hypertension  - Meds were adjusted while he was admitted. We went through his new home med list and verified what he should and should not be taking, see EMR for updated list.      Chronic kidney disease, stage III (moderate)  - renal function checked 10/23 at [facility] and was stable. He will f/u Dr. Carmel Aguilar     Anemia in other chronic diseases classified elsewhere  - he was anemic during hospitalization and required 2 U PRBC. He saw Dr. Adrianna Waller this morning and a CBC was drawn, Dr. Adrianna Waller to manage need for transfusion    MGUS (monoclonal gammopathy of unknown significance)  - saw Dr. Adrianna Waller this morning    Esophagitis  - on EGD, mild. Continue PPI    Chronic diastolic congestive heart failure ([address]  - stable. It is unclear if his demadex was discontinued at discharge from [facility] or not. He was not receiving it at the [facility].   Since DC from NH he has been taking demadex

## 2017-11-21 NOTE — PROGRESS NOTES
Pt to [facility] for blood infusion. Informed consent obtained. Pt resting quietly with caregiver at bedside.

## 2017-11-22 NOTE — PROGRESS NOTES
Subjective: Patient states that  he is here for follow-up of her 10/10/17 left intertrochanteric fracture I am nailing with cerclage wire. States that pain continues to go down. He gets some burning and occasional itching on the distal thigh no pain at the hip whatsoever. He is now out of nursing home and at home  Objective: Physical exam shows incisions are well-healed no evidence of infection sensations intact is antigravity knee flexion and extension and hip flexion and extension negative logroll. No tenderness to palpation of the distal thigh. No varus valgus instability of the knee. Negative logroll  Imagin views of the left hip and 2 views of the left knee show no change in the position of his medina or hardware. He has comminution of the distal aspect of the anterior femur. Assessment and plan: I'm going to gradually let him increase in his weightbearing 25% every week. I told him any pain in his thigh needs to back off.   I gave him Ultram 50 mg p.o. q.8 and he'll follow-up with me in 4 weeks before he goes to [place] repeat x-rays at that time

## 2017-12-08 NOTE — PROGRESS NOTES
200 each 3    aspirin (ASPIRIN CHILDRENS) 81 MG chewable tablet Take 1 tablet by mouth daily 30 tablet 3    Blood Glucose Monitoring Suppl (TRUE METRIX AIR GLUCOSE METER) W/DEVICE KIT Patient tests twice daily. E11.9 1 kit 0    TRUEPLUS LANCETS 28G MISC Patient tests twice daily. DX:E11.9 300 each 3    carvedilol (COREG) 12.5 MG tablet Take 1 tablet by mouth 2 times daily 1 tablet 0     No current facility-administered medications for this visit. Objective:   Physical Exam       Vitals:    12/08/17 1639   BP: (!) 150/50   Pulse: 70   Resp: 18         General:  Awake, alert and oriented. Appears to be not in any distress  Mucous Membranes:  Pink , anicteric  Neck: No JVD, left  carotid bruit, no thyromegaly  Chest:  Clear to auscultation bilaterally, no added sounds  Cardiovascular:  RRR S1S2 heard, no murmurs or gallops  Abdomen:  Soft, obese, undistended, non tender, no organomegaly, BS present  Extremities:   1+ chronic pedal edema,improving. Distal pulses well felt  Neurological : grossly normal          Assessment:     1. Uncontrolled type 2 diabetes mellitus with complication, with long-term current use of insulin ([address]     2. Chronic diastolic congestive heart failure ([address]     3. Displacement of lumbar intervertebral disc without myelopathy     4. Fracture, proximal femur, left, closed, with routine healing, subsequent encounter     5. Anemia of chronic renal failure, stage 3 (moderate)     6. CAROLINA (obstructive sleep apnea)            Plan:         IDDM-with renal complications -improved with novolin 25 units bid  Off metformin   Need A1c   Continue amaryl 4 mg daily. Low carb diet    [place] vascular Hypertension- improved remarkably with bilateral renal arterial stenting ( 6/28/16)   Continue lotensin 40mg daily, terazosin 4 mg.   Clonidine  bid, coreg 12.5 mg bid, norvasc 10 mg, Torsemide 40 mg daily , hydralazine 100 tid  Managed by nephrology    Also on metalazone 3 times a week     CKD - stage 3

## 2017-12-20 NOTE — PROGRESS NOTES
Subjective: Patient states that he is here for follow-up of his 10/10/17 left intertrochanteric fracture IM nailing with removal of supracondylar plate hardware and [place] cables. He states that he is 3-4 out of 10 pain mainly through the anterior thigh and in the medial groin. He has no lateral hip pain. No centrally located groin pain. He has been walking with a rolling walker and occasionally a cane around the house. Objective: Physical exam shows incisions are well-healed. He has some burning pain on the anterior aspect of the thigh. Strength is 3 minus over 5 into hip flexion 3/5 in the knee extension negative logroll no tenderness to palpation lateral 5th  Imagin views of the left hip show the IM nail unchanged in position proximally his hip fracture appears to be healing distally I don't see any change in position of the medina. The medina had breached the anterior cortex intraoperatively. Assessment and plan: I discussed with the patient and his son that the anterior distal thigh pain is what would be limiting for him.   He'll go to physical therapy as an outpatient now and follow up with me in 4 weeks repeat x-rays I don't want him to get off of the walker

## 2017-12-22 NOTE — FLOWSHEET NOTE
[facility], [address] [place] [address] Leon[place]  Phone: [phone]  Fax [phone]    Physical Therapy Daily Treatment Note  Date:  2017    Patient Name:  Merna Ohara    :  1940  MRN: [id number]  Restrictions/Precautions:    Physician Information:  Referring Practitioner: Dr. Helen Varela  Medical/Treatment Diagnosis Information:  · Diagnosis: Left Hip closed Intertrochanteric fx s/p IM nailing  10/10/17  · Treatment Diagnosis: Left Hip Pain (M25.552) / Difficulty Walking (R26.2)     [] Conservative / [] Surgical - DOS:  Therapy Diagnosis/Practice Pattern:  Practice Pattern I: Bony or Soft Tissue Surgery  Insurance/Certification information:  PT Insurance Information: [employer]  - $40CP - $0DED - PT/OT MED NEC - 100% - NEEDS AUTH  Plan of care signed: [] YES  [x] NO  Number of Comorbidities:  []0     []1-2    [x]3+  Date of Patient follow up with Physician: 17    G-Code (if applicable):      Date G-Code Applied:  17  PT G-Codes  Functional Assessment Tool Used: LEFS  Score: 45%  Functional Limitation: Mobility: Walking and moving around  Mobility: Walking and Moving Around Current Status (): At least 40 percent but less than 60 percent impaired, limited or restricted  Mobility: Walking and Moving Around Goal Status ():  At least 20 percent but less than 40 percent impaired, limited or restricted    Progress Note: [x]  Yes  []  No  Next due by: Visit #10        Latex Allergy:  [x]NO      []YES  Preferred Language for Healthcare:   [x]English       []other:    Visit # Insurance Allowable Reporting Period   1 BMN (needs auth) Begin Date: 2017               End Date:      RECERT DUE BY: 29 (8 weeks)    SUBJECTIVE:  See eval    OBJECTIVE: See eval  Observation:  Palpation:     Test used Initial score Current Score   Pain Summary VAS 2-5/10    Functional questionnaire LEFS 45%    ROM flexion 90 deg     Hip self-care, mobility, lifting and ambulation/stair navigation   [] (00452)Reviewed/Progressed HEP activities related to improving balance, coordination, kinesthetic sense, posture, motor skill, proprioception of core, proximal hip and LE for self care, mobility, lifting, and ambulation/stair navigation      Manual Treatments:  PROM / STM / Oscillations-Mobs:  G-I, II, III, IV (PA's, Inf., Post.)  [x] (74727) Provided manual therapy to mobilize LE, proximal hip and/or LS spine soft tissue/joints for the purpose of modulating pain, promoting relaxation,  increasing ROM, reducing/eliminating soft tissue swelling/inflammation/restriction, improving soft tissue extensibility and allowing for proper ROM for normal function with self care, mobility, lifting and ambulation. Modalities:   Declined, ice at home    Charges:  Timed Code Treatment Minutes: 35   Total Treatment Minutes: 55     [] EVAL (LOW) 02260 (typically 20 minutes face-to-face)  [x] EVAL (MOD) 21746 (typically 30 minutes face-to-face)  [] EVAL (HIGH) 72808 (typically 45 minutes face-to-face)  [] RE-EVAL     [x] AJ(84097) x  1   [] IONTO  [] NMR (44161) x      [] VASO  [x] Manual (86134) x  1    [] Other:  [] TA x       [] Mech Traction (77023)  [] ES(attended) (62326)      [] ES (un) (79025):     GOALS: Patient stated goal: Ambulate with cane and be prepared to leave for [place] (mid)     Therapist goals for Patient:   Short Term Goals: To be achieved in: 2 weeks  1. Independent in HEP and progression per patient tolerance, in order to prevent re-injury. 2. Patient will have a decrease in pain to facilitate improvement in movement, function, and ADLs as indicated by Functional Deficits.     Long Term Goals: To be achieved in: 6-8 weeks (hoping x 4 weeks to leave for [place])  1. Disability index score of 38% or less for the LEFS to assist with reaching prior level of function.    2. Patient will demonstrate increased AROM to 40 deg hip ABD to

## 2017-12-22 NOTE — PLAN OF CARE
decision making of [] low, [x] moderate, [] high complexity using standardized patient assessment instrument and/or measurable assessment of functional outcome. [] EVAL (LOW) 23839 (typically 20 minutes face-to-face)  [x] EVAL (MOD) 46154 (typically 30 minutes face-to-face)  [] EVAL (HIGH) 40252 (typically 45 minutes face-to-face)  [] RE-EVAL       PLAN:   Frequency/Duration:  2 days per week for 6-8 Weeks:  Interventions:  [x]  Therapeutic exercise including: strength training, ROM, for Lower extremity and core   [x]  NMR activation and proprioception for LE, Glutes and Core   [x]  Manual therapy as indicated for LE, Hip and spine to include: Dry Needling/IASTM, STM, PROM, Gr I-IV mobilizations, manipulation. [x] Modalities as needed that may include: thermal agents, E-stim, Biofeedback, US, iontophoresis as indicated  [x] Patient education on joint protection, postural re-education, activity modification, progression of HEP. HEP instruction: (see scanned forms)    GOALS:  Patient stated goal: Ambulate with cane and be prepared to leave for [place] ([facility])    Therapist goals for Patient:   Short Term Goals: To be achieved in: 2 weeks  1. Independent in HEP and progression per patient tolerance, in order to prevent re-injury. 2. Patient will have a decrease in pain to facilitate improvement in movement, function, and ADLs as indicated by Functional Deficits. Long Term Goals: To be achieved in: 6-8 weeks (hoping x 4 weeks to leave for [place])  1. Disability index score of 38% or less for the LEFS to assist with reaching prior level of function. 2. Patient will demonstrate increased AROM to 40 deg hip ABD to allow for proper joint functioning as indicated by patients Functional Deficits. 3. Patient will demonstrate an increase in Strength to 3+/5 hip flexion LE to allow for proper functional mobility as indicated by patients Functional Deficits.    4. Patient will return to independent in car

## 2017-12-26 NOTE — FLOWSHEET NOTE
OBJECTIVE:  Observation:  Palpation:     Test used Initial score Current Score   Pain Summary VAS 2-5/10 4   Functional questionnaire LEFS 45%    ROM flexion 90 deg     Hip abd 35 deg    Strength Knee flex 4+     Knee ext 4     flexion 3-         RESTRICTIONS/PRECAUTIONS: WBAT on the walker (do not wean off walker per MD instructions)    Exercises/Interventions:     Therapeutic Ex Sets/reps Notes   BKFO 10 x 5\" HEP   Hip ADD isos 20 x 5\" HEP   TB hip ABD Green TB 15 x  HEP   Bridges 15 x  HEP   SAQ / LAQ X 30/   x20 HEP   QS Already HEP    Glut sets x15  ;05    SB heel slides  10x  With mod assist of one. Standing HS   Left only x10    Standing march left only x10     Mini Squats 2 x 10     HR/ TL x10          Seated HR/ TL X 20 Put extra cushion in chair   Seated ankle circles with knee ext 2 x 10          Manual Intervention     STM to adductors, quad , hip flexor X 6 minutes with roller MHP x 5 min prior to massage   Hip PROM X 3 min    Supine Hip flex stretch 3 x 20\"                   NMR re-education                                                      Therapeutic Exercise and NMR EXR  [x] (09734) Provided verbal/tactile cueing for activities related to strengthening, flexibility, endurance, ROM for improvements in LE, proximal hip, and core control with self care, mobility, lifting, ambulation.  [] (94945) Provided verbal/tactile cueing for activities related to improving balance, coordination, kinesthetic sense, posture, motor skill, proprioception  to assist with LE, proximal hip, and core control in self care, mobility, lifting, ambulation and eccentric single leg control.      NMR and Therapeutic Activities:    [x] (34836 or 60718) Provided verbal/tactile cueing for activities related to improving balance, coordination, kinesthetic sense, posture, motor skill, proprioception and motor activation to allow for proper function of core, proximal hip and LE with self care and ADLs  [] (63847) Gait Re-education- Provided training and instruction to the patient for proper LE, core and proximal hip recruitment and positioning and eccentric body weight control with ambulation re-education including up and down stairs     Home Exercise Program:    [x] (40599) Reviewed/Progressed HEP activities related to strengthening, flexibility, endurance, ROM of core, proximal hip and LE for functional self-care, mobility, lifting and ambulation/stair navigation   [] (32996)Reviewed/Progressed HEP activities related to improving balance, coordination, kinesthetic sense, posture, motor skill, proprioception of core, proximal hip and LE for self care, mobility, lifting, and ambulation/stair navigation      Manual Treatments:  PROM / STM / Oscillations-Mobs:  G-I, II, III, IV (PA's, Inf., Post.)  [x] (64873) Provided manual therapy to mobilize LE, proximal hip and/or LS spine soft tissue/joints for the purpose of modulating pain, promoting relaxation,  increasing ROM, reducing/eliminating soft tissue swelling/inflammation/restriction, improving soft tissue extensibility and allowing for proper ROM for normal function with self care, mobility, lifting and ambulation. Modalities:   Declined, ice at home    Charges:  Timed Code Treatment Minutes: 45   Total Treatment Minutes: 45     [] EVAL (LOW) 83170 (typically 20 minutes face-to-face)  [] EVAL (MOD) 52859 (typically 30 minutes face-to-face)  [] EVAL (HIGH) 39043 (typically 45 minutes face-to-face)  [] RE-EVAL     [x] HT(64829) x  2   [] IONTO  [] NMR (25726) x      [] VASO  [x] Manual (80113) x  1    [] Other:  [] TA x       [] Mech Traction (71148)  [] ES(attended) (11584)      [] ES (un) (55097):     GOALS: Patient stated goal: Ambulate with cane and be prepared to leave for [place] (mid)     Therapist goals for Patient:   Short Term Goals: To be achieved in: 2 weeks  1. Independent in HEP and progression per patient tolerance, in order to prevent re-injury.    2.

## 2018-01-01 ENCOUNTER — OFFICE VISIT (OUTPATIENT)
Dept: INTERNAL MEDICINE CLINIC | Age: 78
End: 2018-01-01

## 2018-01-01 ENCOUNTER — OFFICE VISIT (OUTPATIENT)
Dept: PULMONOLOGY | Age: 78
End: 2018-01-01

## 2018-01-01 ENCOUNTER — TELEPHONE (OUTPATIENT)
Dept: INTERNAL MEDICINE CLINIC | Age: 78
End: 2018-01-01

## 2018-01-01 ENCOUNTER — HOSPITAL ENCOUNTER (OUTPATIENT)
Dept: PHYSICAL THERAPY | Age: 78
Discharge: HOME | End: 2018-01-15
Attending: ORTHOPAEDIC SURGERY | Admitting: ORTHOPAEDIC SURGERY

## 2018-01-01 ENCOUNTER — HOSPITAL ENCOUNTER (OUTPATIENT)
Dept: PHYSICAL THERAPY | Age: 78
Discharge: HOME | End: 2018-01-03
Admitting: ORTHOPAEDIC SURGERY

## 2018-01-01 ENCOUNTER — HOSPITAL ENCOUNTER (OUTPATIENT)
Dept: OTHER | Age: 78
Discharge: HOME | End: 2018-03-06
Attending: INTERNAL MEDICINE | Admitting: INTERNAL MEDICINE

## 2018-01-01 ENCOUNTER — HOSPITAL ENCOUNTER (OUTPATIENT)
Dept: PHYSICAL THERAPY | Age: 78
Discharge: HOME | End: 2018-01-08
Admitting: ORTHOPAEDIC SURGERY

## 2018-01-01 ENCOUNTER — HOSPITAL ENCOUNTER (OUTPATIENT)
Dept: PHYSICAL THERAPY | Age: 78
Discharge: HOME | End: 2018-01-11
Admitting: ORTHOPAEDIC SURGERY

## 2018-01-01 ENCOUNTER — HOSPITAL ENCOUNTER (OUTPATIENT)
Dept: PULMONOLOGY | Age: 78
Discharge: HOME | End: 2018-03-22
Attending: INTERNAL MEDICINE | Admitting: INTERNAL MEDICINE

## 2018-01-01 ENCOUNTER — HOSPITAL ENCOUNTER (EMERGENCY)
Age: 78
End: 2018-08-03
Attending: EMERGENCY MEDICINE
Payer: MEDICARE

## 2018-01-01 ENCOUNTER — HOSPITAL ENCOUNTER (OUTPATIENT)
Dept: PHYSICAL THERAPY | Age: 78
Discharge: HOME | End: 2018-01-15
Admitting: ORTHOPAEDIC SURGERY

## 2018-01-01 ENCOUNTER — OFFICE VISIT (OUTPATIENT)
Dept: ORTHOPEDIC SURGERY | Age: 78
End: 2018-01-01

## 2018-01-01 ENCOUNTER — HOSPITAL ENCOUNTER (OUTPATIENT)
Dept: PHYSICAL THERAPY | Age: 78
Discharge: HOME | End: 2018-01-05
Admitting: ORTHOPAEDIC SURGERY

## 2018-01-01 VITALS
HEART RATE: 70 BPM | WEIGHT: 277 LBS | SYSTOLIC BLOOD PRESSURE: 160 MMHG | RESPIRATION RATE: 18 BRPM | DIASTOLIC BLOOD PRESSURE: 55 MMHG | HEIGHT: 70 IN | BODY MASS INDEX: 39.65 KG/M2

## 2018-01-01 VITALS — BODY MASS INDEX: 38.94 KG/M2 | HEIGHT: 70 IN | WEIGHT: 272 LBS

## 2018-01-01 VITALS
RESPIRATION RATE: 22 BRPM | SYSTOLIC BLOOD PRESSURE: 138 MMHG | BODY MASS INDEX: 40.09 KG/M2 | OXYGEN SATURATION: 96 % | TEMPERATURE: 98 F | HEIGHT: 70 IN | WEIGHT: 280 LBS | HEART RATE: 60 BPM | DIASTOLIC BLOOD PRESSURE: 80 MMHG

## 2018-01-01 VITALS
HEART RATE: 67 BPM | TEMPERATURE: 98.3 F | BODY MASS INDEX: 39.37 KG/M2 | OXYGEN SATURATION: 97 % | WEIGHT: 275 LBS | SYSTOLIC BLOOD PRESSURE: 172 MMHG | HEIGHT: 70 IN | DIASTOLIC BLOOD PRESSURE: 68 MMHG | RESPIRATION RATE: 16 BRPM

## 2018-01-01 VITALS
WEIGHT: 283 LBS | HEART RATE: 60 BPM | SYSTOLIC BLOOD PRESSURE: 160 MMHG | HEIGHT: 70 IN | BODY MASS INDEX: 40.52 KG/M2 | RESPIRATION RATE: 18 BRPM | DIASTOLIC BLOOD PRESSURE: 50 MMHG

## 2018-01-01 VITALS
HEIGHT: 70 IN | DIASTOLIC BLOOD PRESSURE: 75 MMHG | WEIGHT: 276 LBS | SYSTOLIC BLOOD PRESSURE: 160 MMHG | BODY MASS INDEX: 39.51 KG/M2 | RESPIRATION RATE: 18 BRPM | HEART RATE: 70 BPM

## 2018-01-01 DIAGNOSIS — M54.50 CHRONIC MIDLINE LOW BACK PAIN WITHOUT SCIATICA: Primary | ICD-10-CM

## 2018-01-01 DIAGNOSIS — G89.29 CHRONIC MIDLINE LOW BACK PAIN WITHOUT SCIATICA: Primary | ICD-10-CM

## 2018-01-01 DIAGNOSIS — Z79.4 TYPE 2 DIABETES MELLITUS WITH OTHER DIABETIC KIDNEY COMPLICATION, WITH LONG-TERM CURRENT USE OF INSULIN (HCC): Primary | ICD-10-CM

## 2018-01-01 DIAGNOSIS — I10 MALIGNANT HYPERTENSION: ICD-10-CM

## 2018-01-01 DIAGNOSIS — I50.32 CHRONIC DIASTOLIC CONGESTIVE HEART FAILURE (HCC): Primary | ICD-10-CM

## 2018-01-01 DIAGNOSIS — G89.29 CHRONIC MIDLINE LOW BACK PAIN WITHOUT SCIATICA: ICD-10-CM

## 2018-01-01 DIAGNOSIS — I50.31 ACUTE DIASTOLIC CHF (CONGESTIVE HEART FAILURE), NYHA CLASS 3 (HCC): ICD-10-CM

## 2018-01-01 DIAGNOSIS — I10 ESSENTIAL HYPERTENSION: ICD-10-CM

## 2018-01-01 DIAGNOSIS — G47.33 OSA (OBSTRUCTIVE SLEEP APNEA): Primary | ICD-10-CM

## 2018-01-01 DIAGNOSIS — Z00.00 ROUTINE GENERAL MEDICAL EXAMINATION AT A HEALTH CARE FACILITY: Primary | ICD-10-CM

## 2018-01-01 DIAGNOSIS — R93.89 ABNORMAL CT SCAN: ICD-10-CM

## 2018-01-01 DIAGNOSIS — R93.89 ABNORMAL FINDINGS ON DIAGNOSTIC IMAGING OF OTHER SPECIFIED BODY STRUCTURES: ICD-10-CM

## 2018-01-01 DIAGNOSIS — S72.142A CLOSED INTERTROCHANTERIC FRACTURE OF HIP, LEFT, INITIAL ENCOUNTER (HCC): ICD-10-CM

## 2018-01-01 DIAGNOSIS — E66.9 OBESITY (BMI 30-39.9): ICD-10-CM

## 2018-01-01 DIAGNOSIS — J47.9 BRONCHIECTASIS WITHOUT COMPLICATION (HCC): ICD-10-CM

## 2018-01-01 DIAGNOSIS — N18.4 CKD STAGE 4 DUE TO TYPE 2 DIABETES MELLITUS (HCC): ICD-10-CM

## 2018-01-01 DIAGNOSIS — M54.50 CHRONIC MIDLINE LOW BACK PAIN WITHOUT SCIATICA: ICD-10-CM

## 2018-01-01 DIAGNOSIS — E11.22 CKD STAGE 4 DUE TO TYPE 2 DIABETES MELLITUS (HCC): ICD-10-CM

## 2018-01-01 DIAGNOSIS — Z71.89 CPAP USE COUNSELING: ICD-10-CM

## 2018-01-01 DIAGNOSIS — R06.09 DYSPNEA ON EXERTION: ICD-10-CM

## 2018-01-01 DIAGNOSIS — E11.29 TYPE 2 DIABETES MELLITUS WITH OTHER DIABETIC KIDNEY COMPLICATION, WITH LONG-TERM CURRENT USE OF INSULIN (HCC): ICD-10-CM

## 2018-01-01 DIAGNOSIS — N18.30 CHRONIC KIDNEY DISEASE, STAGE III (MODERATE) (HCC): ICD-10-CM

## 2018-01-01 DIAGNOSIS — S72.402D CLOSED FRACTURE OF DISTAL END OF LEFT FEMUR WITH ROUTINE HEALING, UNSPECIFIED FRACTURE MORPHOLOGY, SUBSEQUENT ENCOUNTER: ICD-10-CM

## 2018-01-01 DIAGNOSIS — N18.4 DIABETES MELLITUS DUE TO UNDERLYING CONDITION, CONTROLLED, WITH STAGE 4 CHRONIC KIDNEY DISEASE, WITH LONG-TERM CURRENT USE OF INSULIN (HCC): ICD-10-CM

## 2018-01-01 DIAGNOSIS — I50.32 CHRONIC DIASTOLIC CONGESTIVE HEART FAILURE (HCC): ICD-10-CM

## 2018-01-01 DIAGNOSIS — E08.22 DIABETES MELLITUS DUE TO UNDERLYING CONDITION, CONTROLLED, WITH STAGE 4 CHRONIC KIDNEY DISEASE, WITH LONG-TERM CURRENT USE OF INSULIN (HCC): ICD-10-CM

## 2018-01-01 DIAGNOSIS — N18.4 CHRONIC KIDNEY DISEASE (CKD), STAGE IV (SEVERE) (HCC): ICD-10-CM

## 2018-01-01 DIAGNOSIS — I50.31 ACUTE DIASTOLIC CHF (CONGESTIVE HEART FAILURE), NYHA CLASS 3 (HCC): Primary | ICD-10-CM

## 2018-01-01 DIAGNOSIS — M48.061 SPINAL STENOSIS, LUMBAR REGION, WITHOUT NEUROGENIC CLAUDICATION: Chronic | ICD-10-CM

## 2018-01-01 DIAGNOSIS — Z79.4 TYPE 2 DIABETES MELLITUS WITH OTHER DIABETIC KIDNEY COMPLICATION, WITH LONG-TERM CURRENT USE OF INSULIN (HCC): ICD-10-CM

## 2018-01-01 DIAGNOSIS — K21.9 GASTROESOPHAGEAL REFLUX DISEASE WITHOUT ESOPHAGITIS: ICD-10-CM

## 2018-01-01 DIAGNOSIS — M79.605 LEFT LEG PAIN: Primary | ICD-10-CM

## 2018-01-01 DIAGNOSIS — I46.9 SUDDEN CARDIAC DEATH (HCC): Primary | ICD-10-CM

## 2018-01-01 DIAGNOSIS — E11.29 TYPE 2 DIABETES MELLITUS WITH OTHER DIABETIC KIDNEY COMPLICATION, WITH LONG-TERM CURRENT USE OF INSULIN (HCC): Primary | ICD-10-CM

## 2018-01-01 DIAGNOSIS — I25.10 CORONARY ARTERY DISEASE INVOLVING NATIVE CORONARY ARTERY OF NATIVE HEART WITHOUT ANGINA PECTORIS: ICD-10-CM

## 2018-01-01 DIAGNOSIS — Z79.4 DIABETES MELLITUS DUE TO UNDERLYING CONDITION, CONTROLLED, WITH STAGE 4 CHRONIC KIDNEY DISEASE, WITH LONG-TERM CURRENT USE OF INSULIN (HCC): ICD-10-CM

## 2018-01-01 LAB
ANION GAP SERPL CALCULATED.3IONS-SCNC: 21 MMOL/L (ref 3–16)
BUN BLDV-MCNC: 35 MG/DL (ref 7–20)
CALCIUM SERPL-MCNC: 9.1 MG/DL (ref 8.3–10.6)
CHLORIDE BLD-SCNC: 103 MMOL/L (ref 99–110)
CO2: 20 MMOL/L (ref 21–32)
CREAT SERPL-MCNC: 2.8 MG/DL (ref 0.8–1.3)
ESTIMATED AVERAGE GLUCOSE: 194.4 MG/DL
GFR AFRICAN AMERICAN: 27
GFR NON-AFRICAN AMERICAN: 22
GLUCOSE BLD-MCNC: 181 MG/DL (ref 70–99)
HBA1C MFR BLD: 8.4 %
POTASSIUM SERPL-SCNC: 5.2 MMOL/L (ref 3.5–5.1)
PROSTATE SPECIFIC ANTIGEN: 11.84 NG/ML (ref 0–4)
SODIUM BLD-SCNC: 144 MMOL/L (ref 136–145)

## 2018-01-01 PROCEDURE — 1036F TOBACCO NON-USER: CPT | Performed by: PHYSICIAN ASSISTANT

## 2018-01-01 PROCEDURE — G8484 FLU IMMUNIZE NO ADMIN: HCPCS | Performed by: PHYSICIAN ASSISTANT

## 2018-01-01 PROCEDURE — 1036F TOBACCO NON-USER: CPT | Performed by: INTERNAL MEDICINE

## 2018-01-01 PROCEDURE — G8417 CALC BMI ABV UP PARAM F/U: HCPCS | Performed by: INTERNAL MEDICINE

## 2018-01-01 PROCEDURE — G8599 NO ASA/ANTIPLAT THER USE RNG: HCPCS | Performed by: INTERNAL MEDICINE

## 2018-01-01 PROCEDURE — G8427 DOCREV CUR MEDS BY ELIG CLIN: HCPCS | Performed by: INTERNAL MEDICINE

## 2018-01-01 PROCEDURE — 4040F PNEUMOC VAC/ADMIN/RCVD: CPT | Performed by: INTERNAL MEDICINE

## 2018-01-01 PROCEDURE — G8417 CALC BMI ABV UP PARAM F/U: HCPCS | Performed by: NURSE PRACTITIONER

## 2018-01-01 PROCEDURE — 99285 EMERGENCY DEPT VISIT HI MDM: CPT

## 2018-01-01 PROCEDURE — 99213 OFFICE O/P EST LOW 20 MIN: CPT | Performed by: NURSE PRACTITIONER

## 2018-01-01 PROCEDURE — 4040F PNEUMOC VAC/ADMIN/RCVD: CPT | Performed by: PHYSICIAN ASSISTANT

## 2018-01-01 PROCEDURE — 1123F ACP DISCUSS/DSCN MKR DOCD: CPT | Performed by: PHYSICIAN ASSISTANT

## 2018-01-01 PROCEDURE — 99213 OFFICE O/P EST LOW 20 MIN: CPT | Performed by: INTERNAL MEDICINE

## 2018-01-01 PROCEDURE — 4040F PNEUMOC VAC/ADMIN/RCVD: CPT | Performed by: NURSE PRACTITIONER

## 2018-01-01 PROCEDURE — 1101F PT FALLS ASSESS-DOCD LE1/YR: CPT | Performed by: INTERNAL MEDICINE

## 2018-01-01 PROCEDURE — G8482 FLU IMMUNIZE ORDER/ADMIN: HCPCS | Performed by: INTERNAL MEDICINE

## 2018-01-01 PROCEDURE — G8427 DOCREV CUR MEDS BY ELIG CLIN: HCPCS | Performed by: NURSE PRACTITIONER

## 2018-01-01 PROCEDURE — 92950 HEART/LUNG RESUSCITATION CPR: CPT

## 2018-01-01 PROCEDURE — G8599 NO ASA/ANTIPLAT THER USE RNG: HCPCS | Performed by: NURSE PRACTITIONER

## 2018-01-01 PROCEDURE — 1123F ACP DISCUSS/DSCN MKR DOCD: CPT | Performed by: NURSE PRACTITIONER

## 2018-01-01 PROCEDURE — 1123F ACP DISCUSS/DSCN MKR DOCD: CPT | Performed by: INTERNAL MEDICINE

## 2018-01-01 PROCEDURE — G8599 NO ASA/ANTIPLAT THER USE RNG: HCPCS | Performed by: PHYSICIAN ASSISTANT

## 2018-01-01 PROCEDURE — G8428 CUR MEDS NOT DOCUMENT: HCPCS | Performed by: PHYSICIAN ASSISTANT

## 2018-01-01 PROCEDURE — 99214 OFFICE O/P EST MOD 30 MIN: CPT | Performed by: INTERNAL MEDICINE

## 2018-01-01 PROCEDURE — G8417 CALC BMI ABV UP PARAM F/U: HCPCS | Performed by: PHYSICIAN ASSISTANT

## 2018-01-01 PROCEDURE — 1036F TOBACCO NON-USER: CPT | Performed by: NURSE PRACTITIONER

## 2018-01-01 PROCEDURE — G8484 FLU IMMUNIZE NO ADMIN: HCPCS | Performed by: INTERNAL MEDICINE

## 2018-01-01 PROCEDURE — 99213 OFFICE O/P EST LOW 20 MIN: CPT | Performed by: PHYSICIAN ASSISTANT

## 2018-01-01 RX ORDER — HYDROCODONE BITARTRATE AND ACETAMINOPHEN 5; 325 MG/1; MG/1
1 TABLET ORAL EVERY 8 HOURS PRN
Qty: 90 TABLET | Refills: 0 | Status: SHIPPED | OUTPATIENT
Start: 2018-01-01 | End: 2018-01-01 | Stop reason: SDUPTHER

## 2018-01-01 RX ORDER — GLIMEPIRIDE 2 MG/1
TABLET ORAL
Qty: 360 TABLET | Refills: 0 | Status: SHIPPED | OUTPATIENT
Start: 2018-01-01

## 2018-01-01 RX ORDER — HYDRALAZINE HYDROCHLORIDE 100 MG/1
TABLET, FILM COATED ORAL
Qty: 270 TABLET | Refills: 0 | Status: SHIPPED | OUTPATIENT
Start: 2018-01-01

## 2018-01-01 RX ORDER — HYDRALAZINE HYDROCHLORIDE 100 MG/1
TABLET, FILM COATED ORAL
Qty: 270 TABLET | Refills: 0 | Status: SHIPPED | OUTPATIENT
Start: 2018-01-01 | End: 2018-01-01 | Stop reason: SDUPTHER

## 2018-01-01 RX ORDER — NIFEDIPINE 60 MG/1
60 TABLET, FILM COATED, EXTENDED RELEASE ORAL DAILY
Qty: 90 TABLET | Refills: 0 | Status: SHIPPED | OUTPATIENT
Start: 2018-01-01

## 2018-01-01 RX ORDER — GABAPENTIN 300 MG/1
CAPSULE ORAL
Qty: 126 CAPSULE | Refills: 0 | Status: SHIPPED | OUTPATIENT
Start: 2018-01-01 | End: 2018-01-01 | Stop reason: ALTCHOICE

## 2018-01-01 RX ORDER — ALLOPURINOL 100 MG/1
TABLET ORAL
Qty: 90 TABLET | Refills: 0 | Status: SHIPPED | OUTPATIENT
Start: 2018-01-01

## 2018-01-01 RX ORDER — ALBUTEROL SULFATE 2.5 MG/3ML
2.5 SOLUTION RESPIRATORY (INHALATION) ONCE
Status: COMPLETED | OUTPATIENT
Start: 2018-01-01 | End: 2018-01-01

## 2018-01-01 RX ORDER — ALLOPURINOL 100 MG/1
TABLET ORAL
Qty: 90 TABLET | Refills: 0 | Status: SHIPPED | OUTPATIENT
Start: 2018-01-01 | End: 2018-01-01 | Stop reason: SDUPTHER

## 2018-01-01 RX ORDER — HYDROCODONE BITARTRATE AND ACETAMINOPHEN 5; 325 MG/1; MG/1
1 TABLET ORAL EVERY 8 HOURS PRN
Qty: 90 TABLET | Refills: 0 | Status: SHIPPED | OUTPATIENT
Start: 2018-01-01 | End: 2018-08-09

## 2018-01-01 RX ORDER — GLIMEPIRIDE 2 MG/1
TABLET ORAL
Qty: 360 TABLET | Refills: 0 | Status: SHIPPED | OUTPATIENT
Start: 2018-01-01 | End: 2018-01-01 | Stop reason: SDUPTHER

## 2018-01-01 RX ORDER — SYRINGE-NEEDLE,INSULIN,0.5 ML 31 GX5/16"
SYRINGE, EMPTY DISPOSABLE MISCELLANEOUS
Qty: 270 EACH | Refills: 5 | Status: SHIPPED | OUTPATIENT
Start: 2018-01-01

## 2018-01-01 RX ORDER — FAMOTIDINE 20 MG/1
20 TABLET, FILM COATED ORAL NIGHTLY
Qty: 30 TABLET | Refills: 0 | Status: SHIPPED | OUTPATIENT
Start: 2018-01-01

## 2018-01-01 RX ORDER — GABAPENTIN 300 MG/1
CAPSULE ORAL
Qty: 540 CAPSULE | Refills: 0 | Status: SHIPPED | OUTPATIENT
Start: 2018-01-01 | End: 2018-08-29

## 2018-01-01 RX ORDER — GABAPENTIN 300 MG/1
CAPSULE ORAL
Qty: 540 CAPSULE | Refills: 0 | Status: SHIPPED | OUTPATIENT
Start: 2018-01-01 | End: 2018-01-01 | Stop reason: SDUPTHER

## 2018-01-01 RX ADMIN — ALBUTEROL SULFATE 2.5 MG: 2.5 SOLUTION RESPIRATORY (INHALATION) at 09:53

## 2018-01-01 ASSESSMENT — ENCOUNTER SYMPTOMS
CHEST TIGHTNESS: 0
COUGH: 0
BACK PAIN: 1
BACK PAIN: 1
RHINORRHEA: 0
SHORTNESS OF BREATH: 0
SHORTNESS OF BREATH: 1
CHEST TIGHTNESS: 0
SHORTNESS OF BREATH: 0
COUGH: 0
RHINORRHEA: 0
COUGH: 0
COLOR CHANGE: 0
BACK PAIN: 1
RHINORRHEA: 0
CHEST TIGHTNESS: 0

## 2018-01-01 ASSESSMENT — SLEEP AND FATIGUE QUESTIONNAIRES
HOW LIKELY ARE YOU TO NOD OFF OR FALL ASLEEP WHILE WATCHING TV: 0
HOW LIKELY ARE YOU TO NOD OFF OR FALL ASLEEP IN A CAR, WHILE STOPPED FOR A FEW MINUTES IN TRAFFIC: 0
HOW LIKELY ARE YOU TO NOD OFF OR FALL ASLEEP WHILE SITTING INACTIVE IN A PUBLIC PLACE: 0
HOW LIKELY ARE YOU TO NOD OFF OR FALL ASLEEP WHILE SITTING AND TALKING TO SOMEONE: 0
HOW LIKELY ARE YOU TO NOD OFF OR FALL ASLEEP WHEN YOU ARE A PASSENGER IN A CAR FOR AN HOUR WITHOUT A BREAK: 0
ESS TOTAL SCORE: 2
HOW LIKELY ARE YOU TO NOD OFF OR FALL ASLEEP WHILE SITTING AND READING: 2
NECK CIRCUMFERENCE (INCHES): 17.75
HOW LIKELY ARE YOU TO NOD OFF OR FALL ASLEEP WHILE LYING DOWN TO REST IN THE AFTERNOON WHEN CIRCUMSTANCES PERMIT: 0
HOW LIKELY ARE YOU TO NOD OFF OR FALL ASLEEP WHILE SITTING QUIETLY AFTER LUNCH WITHOUT ALCOHOL: 0

## 2018-01-01 ASSESSMENT — PATIENT HEALTH QUESTIONNAIRE - PHQ9
1. LITTLE INTEREST OR PLEASURE IN DOING THINGS: 0
SUM OF ALL RESPONSES TO PHQ9 QUESTIONS 1 & 2: 0
2. FEELING DOWN, DEPRESSED OR HOPELESS: 0
SUM OF ALL RESPONSES TO PHQ QUESTIONS 1-9: 0

## 2018-01-03 NOTE — FLOWSHEET NOTE
proper LE, core and proximal hip recruitment and positioning and eccentric body weight control with ambulation re-education including up and down stairs     Home Exercise Program:    [x] (72337) Reviewed/Progressed HEP activities related to strengthening, flexibility, endurance, ROM of core, proximal hip and LE for functional self-care, mobility, lifting and ambulation/stair navigation   [] (23780)Reviewed/Progressed HEP activities related to improving balance, coordination, kinesthetic sense, posture, motor skill, proprioception of core, proximal hip and LE for self care, mobility, lifting, and ambulation/stair navigation      Manual Treatments:  PROM / STM / Oscillations-Mobs:  G-I, II, III, IV (PA's, Inf., Post.)  [x] (74322) Provided manual therapy to mobilize LE, proximal hip and/or LS spine soft tissue/joints for the purpose of modulating pain, promoting relaxation,  increasing ROM, reducing/eliminating soft tissue swelling/inflammation/restriction, improving soft tissue extensibility and allowing for proper ROM for normal function with self care, mobility, lifting and ambulation. Modalities:   Declined, ice at home    Charges:  Timed Code Treatment Minutes: 45   Total Treatment Minutes: 45     [] EVAL (LOW) 41077 (typically 20 minutes face-to-face)  [] EVAL (MOD) 71929 (typically 30 minutes face-to-face)  [] EVAL (HIGH) 04020 (typically 45 minutes face-to-face)  [] RE-EVAL     [x] HR(58327) x  2   [] IONTO  [] NMR (34431) x      [] VASO  [x] Manual (11066) x  1    [] Other:  [] TA x       [] Mech Traction (40995)  [] ES(attended) (32656)      [] ES (un) (61026):     GOALS: Patient stated goal: Ambulate with cane and be prepared to leave for [place] (mid)     Therapist goals for Patient:   Short Term Goals: To be achieved in: 2 weeks  1. Independent in HEP and progression per patient tolerance, in order to prevent re-injury.    2. Patient will have a decrease in pain to facilitate improvement in

## 2018-01-08 NOTE — FLOWSHEET NOTE
[facility], [address] [place] [address] [place]  Phone: [phone]  Fax [phone]    Physical Therapy Daily Treatment Note  Date:  2018    Patient Name:  Nadeem Monterroso    :  1940  MRN: [id number]  Restrictions/Precautions:    Physician Information:  Referring Practitioner: Dr. Twin Gilbert  Medical/Treatment Diagnosis Information:  · Diagnosis: Left Hip closed Intertrochanteric fx s/p IM nailing  10/10/17  · Treatment Diagnosis: Left Hip Pain (M25.552) / Difficulty Walking (R26.2)     [] Conservative / [] Surgical - DOS:  Therapy Diagnosis/Practice Pattern:  Practice Pattern I: Bony or Soft Tissue Surgery  Insurance/Certification information:  PT Insurance Information: [employer]  - $40CP - $0DED - PT/OT MED NEC - 100% - NEEDS AUTH  Plan of care signed: [] YES  [x] NO  Number of Comorbidities:  []0     []1-2    [x]3+  Date of Patient follow up with Physician: 17    G-Code (if applicable):      Date G-Code Applied:  17  PT G-Codes  Functional Assessment Tool Used: LEFS  Score: 45%  Functional Limitation: Mobility: Walking and moving around  Mobility: Walking and Moving Around Current Status (): At least 40 percent but less than 60 percent impaired, limited or restricted  Mobility: Walking and Moving Around Goal Status (): At least 20 percent but less than 40 percent impaired, limited or restricted    Progress Note: [x]  Yes  []  No  Next due by: Visit #10        Latex Allergy:  [x]NO      []YES  Preferred Language for Healthcare:   [x]English       []other:    Visit # Insurance Allowable Reporting Period   6 / 3 in 2018 BMN (needs auth) Begin Date: 2017               End Date:      RECERT DUE BY: 25 (8 weeks)    SUBJECTIVE:   Lidia Mckeon to wean off the walker but has been compliant with use. Hip continues to feel well but thigh discomfort / tightness persists. Overall improving.

## 2018-01-11 NOTE — FLOWSHEET NOTE
Score   Pain Summary VAS 2-5/10 4   Functional questionnaire LEFS 45%    ROM flexion 90 deg     Hip abd 35 deg    Strength Knee flex 4+     Knee ext 4     flexion 3-         RESTRICTIONS/PRECAUTIONS: WBAT on the walker (do not wean off walker per MD instructions)    Exercises/Interventions:     Therapeutic Ex Sets/reps Notes   HEP   Hip ADD isos 30 x 5\" HEP   TB hip ABD ID 30 x 5\" HEP   Bridges 15 x  HEP   SAQ / LAQ 3#  X 25/  3#   x25 HEP         SB heel slides  playground ball 20x  . Supine march X 10 each    Standing HS   Left only 2 x10    Standing march left only 3#   2 x10     Mini Squats 2 x 10     HR/ TL x10     Standing left hip flex 2 X 10     Seated Hip flex 2 x 10     Seated ankle circles with knee ext 2 x 10          FSU 4\" 15 x     Std TKE Blue 15 x 5\"     Bike X 5 min     Manual Intervention     STM to adductors, quad , hip flexor X 5 minutes with roller    Hip PROM X 3 min    Supine Hip flex stretch 3 min. Man HS  3 min              NMR re-education                                                      Therapeutic Exercise and NMR EXR  [x] (85765) Provided verbal/tactile cueing for activities related to strengthening, flexibility, endurance, ROM for improvements in LE, proximal hip, and core control with self care, mobility, lifting, ambulation.  [] (83465) Provided verbal/tactile cueing for activities related to improving balance, coordination, kinesthetic sense, posture, motor skill, proprioception  to assist with LE, proximal hip, and core control in self care, mobility, lifting, ambulation and eccentric single leg control.      NMR and Therapeutic Activities:    [x] (12063 or 81874) Provided verbal/tactile cueing for activities related to improving balance, coordination, kinesthetic sense, posture, motor skill, proprioception and motor activation to allow for proper function of core, proximal hip and LE with self care and ADLs  [] (61747) Gait Re-education- Provided training and instruction to the patient for proper LE, core and proximal hip recruitment and positioning and eccentric body weight control with ambulation re-education including up and down stairs     Home Exercise Program:    [x] (01222) Reviewed/Progressed HEP activities related to strengthening, flexibility, endurance, ROM of core, proximal hip and LE for functional self-care, mobility, lifting and ambulation/stair navigation   [] (47045)Reviewed/Progressed HEP activities related to improving balance, coordination, kinesthetic sense, posture, motor skill, proprioception of core, proximal hip and LE for self care, mobility, lifting, and ambulation/stair navigation      Manual Treatments:  PROM / STM / Oscillations-Mobs:  G-I, II, III, IV (PA's, Inf., Post.)  [x] (27237) Provided manual therapy to mobilize LE, proximal hip and/or LS spine soft tissue/joints for the purpose of modulating pain, promoting relaxation,  increasing ROM, reducing/eliminating soft tissue swelling/inflammation/restriction, improving soft tissue extensibility and allowing for proper ROM for normal function with self care, mobility, lifting and ambulation. Modalities:   Declined, ice at home    Charges:  Timed Code Treatment Minutes: 45   Total Treatment Minutes: 45     [] EVAL (LOW) 08819 (typically 20 minutes face-to-face)  [] EVAL (MOD) 37266 (typically 30 minutes face-to-face)  [] EVAL (HIGH) 24375 (typically 45 minutes face-to-face)  [] RE-EVAL     [x] XT(62272) x  2   [] IONTO  [] NMR (35297) x      [] VASO  [x] Manual (12091) x  1    [] Other:  [] TA x       [] Mech Traction (61668)  [] ES(attended) (85050)      [] ES (un) (72542):     GOALS: Patient stated goal: Ambulate with cane and be prepared to leave for [place] (mid)     Therapist goals for Patient:   Short Term Goals: To be achieved in: 2 weeks  1. Independent in HEP and progression per patient tolerance, in order to prevent re-injury.    2. Patient will have a decrease in pain to facilitate

## 2018-01-15 NOTE — FLOWSHEET NOTE
Sleeping with pillow between the knees. OBJECTIVE:  Observation:  Palpation:     Test used Initial score Current Score   Pain Summary VAS 2-5/10 2-3/10   Functional questionnaire LEFS 45% 34%   ROM flexion 90 deg 95    Hip abd 35 deg 38 deg   Strength Knee flex 4+ 5    Knee ext 4 4+    flexion 3- 4        RESTRICTIONS/PRECAUTIONS: WBAT on the walker (do not wean off walker per MD instructions)    Exercises/Interventions:     Therapeutic Ex Sets/reps Notes   HEP   Hip ADD isos 30 x 5\" HEP   TB hip ABD ME 30 x 5\" HEP   Bridges 15 x  HEP   SAQ / LAQ 3#  X 25/  3#   x25 HEP         SB heel slides  playground ball 20x  . Supine march X 20 each    Standing HS   Left only 2 x10    Standing march left only 3#   2 x10     Mini Squats 2 x 10     gout   Standing left hip flex 2 X 10     Seated Hip flex 2 x 10           FSU 4\" 15 x     Std TKE Blue 15 x 5\"     Bike X 5 min     Manual Intervention     STM to adductors, quad , hip flexor X 5 minutes with roller    Hip PROM X 3 min    Supine Hip flex stretch 3 min. Man HS  3 min              NMR re-education                                                      Therapeutic Exercise and NMR EXR  [x] (95866) Provided verbal/tactile cueing for activities related to strengthening, flexibility, endurance, ROM for improvements in LE, proximal hip, and core control with self care, mobility, lifting, ambulation.  [] (43398) Provided verbal/tactile cueing for activities related to improving balance, coordination, kinesthetic sense, posture, motor skill, proprioception  to assist with LE, proximal hip, and core control in self care, mobility, lifting, ambulation and eccentric single leg control.      NMR and Therapeutic Activities:    [x] (02953 or 60646) Provided verbal/tactile cueing for activities related to improving balance, coordination, kinesthetic sense, posture, motor skill, proprioception and motor activation to allow for proper function of core, proximal hip and LE with self care and ADLs  [] (88436) Gait Re-education- Provided training and instruction to the patient for proper LE, core and proximal hip recruitment and positioning and eccentric body weight control with ambulation re-education including up and down stairs     Home Exercise Program:    [x] (24121) Reviewed/Progressed HEP activities related to strengthening, flexibility, endurance, ROM of core, proximal hip and LE for functional self-care, mobility, lifting and ambulation/stair navigation   [] (89931)Reviewed/Progressed HEP activities related to improving balance, coordination, kinesthetic sense, posture, motor skill, proprioception of core, proximal hip and LE for self care, mobility, lifting, and ambulation/stair navigation      Manual Treatments:  PROM / STM / Oscillations-Mobs:  G-I, II, III, IV (PA's, Inf., Post.)  [x] (45838) Provided manual therapy to mobilize LE, proximal hip and/or LS spine soft tissue/joints for the purpose of modulating pain, promoting relaxation,  increasing ROM, reducing/eliminating soft tissue swelling/inflammation/restriction, improving soft tissue extensibility and allowing for proper ROM for normal function with self care, mobility, lifting and ambulation. Modalities:   Declined, ice at home    Charges:  Timed Code Treatment Minutes: 45   Total Treatment Minutes: 45     [] EVAL (LOW) 54722 (typically 20 minutes face-to-face)  [] EVAL (MOD) 73560 (typically 30 minutes face-to-face)  [] EVAL (HIGH) 14809 (typically 45 minutes face-to-face)  [] RE-EVAL     [x] RF(03271) x  2   [] IONTO  [] NMR (80130) x      [] VASO  [x] Manual (81259) x  1    [] Other:  [] TA x       [] Mech Traction (46591)  [] ES(attended) (05723)      [] ES (un) (25112):     GOALS: Patient stated goal: Ambulate with cane and be prepared to leave for [place] (mid)     Therapist goals for Patient:   Short Term Goals: To be achieved in: 2 weeks  1.  Independent in HEP and progression per patient tolerance, in

## 2018-01-17 NOTE — PROGRESS NOTES
transition to a cane. He will follow-up with Dr. Naomy López in early March once he returns from [place]. This dictation was performed with a verbal recognition program (DRAGON) and it was checked for errors. It is possible that there are still dictated errors within this office note. If so, please bring any errors to my attention for an addendum. All efforts were made to ensure that this office note is accurate.

## 2018-01-18 NOTE — DISCHARGE SUMMARY
[facility],  [address]  Phone: [phone]  Fax [phone]       Physical Therapy Discharge  Date: 2018        Patient Name:  Jori Lennox    :  1940  MRN: [id number]  Referring Physician:Dr. Barragan   Diagnosis:Left Hip closed Intertrochanteric fx s/p IM nailing  10/10/17                        ICD Code:Left Hip Pain (M25.552) / Difficulty Walking (R26.2)  [x] Surgical [] Conservative  Therapy Diagnosis/Practice Pattern:I      Number of Comorbidities:  []0     []1-2    [x]3+  Total number of visits: 8   Reporting Period:   Beginning Date:17   End Date:1/15/18    ·        Test used Initial score Current Score   Pain Summary VAS 2-510 2-310   Functional questionnaire LEFS 45% 34%   ROM flexion 90 deg 95     Hip abd 35 deg 38 deg   Strength Knee flex 4+ 5     Knee ext 4 4+     flexion 3- 4         Functional Limitation G-Code (if applicable):         PT G-Codes  Functional Assessment Tool Used: LEFS  Score: 34%  Functional Limitation: Mobility: Walking and moving around  Mobility: Walking and Moving Around Current Status (): At least 20 percent but less than 40 percent impaired, limited or restricted  Mobility: Walking and Moving Around Goal Status (): At least 20 percent but less than 40 percent impaired, limited or restricted  Mobility: Walking and Moving Around Discharge Status ():  At least 20 percent but less than 40 percent impaired, limited or restricted   Test/tests used to determine % limitation:  Actual Score used to drive % limitation:    Treatment to date:  [x] Therapeutic Exercise    [x] Modalities:  [] Therapeutic Activity             []Ultrasound            []Electrical Stimulation  [] Gait Training     []Cervical Traction    [] Lumbar Traction  [x] Neuromuscular Re-education [x] Cold/hotpack         []Iontophoresis  [x] Instruction in HEP      Other:  [x] Manual

## 2018-03-02 NOTE — PROGRESS NOTES
mouth every 8 hours as needed for Pain for up to 30 days. 90 tablet 0    hydrALAZINE (APRESOLINE) 100 MG tablet TAKE 1 TABLET THREE TIMES DAILY 270 tablet 0    gabapentin (NEURONTIN) 300 MG capsule TAKE 3 CAPSULES IN THE MORNING AND TAKE 3 CAPSULES IN THE EVENING. 540 capsule 0    glimepiride (AMARYL) 2 MG tablet TAKE 2 TABLETS TWICE DAILY 360 tablet 0    NOVOLIN 70/30 (70-30) 100 UNIT/ML injection vial INJECT 25 UNITS TWICE DAILY. 30 mL 3    Blood Glucose Monitoring Suppl (ACCU-CHEK AILEEN PLUS) w/Device KIT Use to test twice daily. DX;E11.9 1 kit 0    glucose blood VI test strips (ACCU-CHEK AILEEN PLUS) strip Use to test twice daily. DX;E11.9 100 each 3    ACCU-CHEK SOFTCLIX LANCETS MISC Use to test twice daily. DX; E11.9 100 each 3    omeprazole (PRILOSEC) 20 MG delayed release capsule Take 20 mg by mouth daily      metolazone (ZAROXOLYN) 2.5 MG tablet Take 2.5 mg by mouth Five times weekly      NIFEdipine (ADALAT CC) 60 MG extended release tablet Take 1 tablet by mouth daily 30 tablet 2    isosorbide mononitrate (IMDUR) 120 MG extended release tablet Take 1 tablet by mouth daily 30 tablet 2    torsemide (DEMADEX) 20 MG tablet TAKE 1 TABLET EVERY DAY 90 tablet 0    cloNIDine (CATAPRES) 0.2 MG tablet Take 1 tablet by mouth 3 times daily 90 tablet 0    allopurinol (ZYLOPRIM) 100 MG tablet TAKE 1 TABLET EVERY DAY 90 tablet 0    insulin 70-30 (NOVOLIN 70/30) (70-30) 100 UNIT per ML injection vial Inject 35 units twice daily. 30 mL 3    BD INSULIN SYRINGE ULTRAFINE 31G X 5/16\" 0.5 ML MISC USE AS DIRECTED WITH INSULIN 270 each 5    terazosin (HYTRIN) 2 MG capsule Take 1 capsule by mouth nightly 7 capsule 0    Alcohol Swabs (B-D SINGLE USE SWABS REGULAR) PADS USE WITH TESTING SUPPLIES AS DIRECTED 200 each 11    glucose blood VI test strips (ASCENSIA AUTODISC VI;ONE TOUCH ULTRA TEST VI) strip Patient tests twice daily.   DX: E11.9 200 each 3    aspirin (ASPIRIN CHILDRENS) 81 MG chewable tablet Take 1

## 2018-03-05 NOTE — TELEPHONE ENCOUNTER
----- Message from Sanchez Silveira MD sent at 3/5/2018 12:04 PM EST -----  cxr ordered  ----- Message -----  From: Devika Espino  Sent: 3/5/2018  10:55 AM  To: Sanchez Silveira MD    Swelling is getting better, but breathing is not. States you said something about a cxr or ct Please advise  ----- Message -----  From: Sanchez Silveira MD  Sent: 3/5/2018  10:50 AM  To: Devika Espino    Is his swelling getting better    ----- Message -----  From: Devika Espino  Sent: 3/5/2018  10:47 AM  To: Sanchez Silveira MD    Pt states you were going to call him?

## 2018-03-22 NOTE — PROGRESS NOTES
[facility]. LABS:  Reviewed any pertinent new labs that are available. PFTs 11/2/10  FVC  (%) FEV1 3.19 (114%) FEV1/FVC ratio WNL   TLC 5.93 (91%)  RV  (%)   DLCO (62%) Bronchodilator response:  2014 PFT at [facility] within normal limits, DLCO 77%  3/22/18  FVC  (85%) FEV1 2/74 (96%) FEV1/FVC ratio 0.82   TLC  (82%)  RV  (83%)   DLCO (58%) Bronchodilator response:  No    6MWT: 140 ft, 97%, stopped at 2 minutes due to fatigue and le pain    IMAGING:  I personally reviewed and interpreted the following today in the office:   12/7/16 Chest CT:  Bilateral bronchiectasis most notable in the lower lobes grossly similar likely postinfectious    in etiology. Mild subpleural fibrotic changes most notably seen right upper lobe stable.  Main pulmonary    artery mildly prominent which could represent mild pulmonary arterial hypertension. Scattered pulmonary nodules unchanged since 2015 examination.  The study of 2013 is currently    not available for review.  Previous 2015 report indicates stability from the 2013 study. 3/22/18  No significant interval change in distribution of bilateral pulmonary nodules   as compared to prior examination.       Persistent scattered ground-glass opacity throughout the lungs with   associated bronchiectasis, which can reflect early finding of pulmonary   fibrosis.       Persistent mild mediastinal adenopathy. 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

## 2018-06-24 NOTE — FLOWSHEET NOTE
LEFS 45%    ROM flexion 90 deg     Hip abd 35 deg    Strength Knee flex 4+     Knee ext 4     flexion 3-         RESTRICTIONS/PRECAUTIONS: WBAT on the walker (do not wean off walker per MD instructions)    Exercises/Interventions:     Therapeutic Ex Sets/reps Notes   BKFO 10 x 5\" HEP   Hip ADD isos 30 x 5\" HEP   TB hip ABD Green TB 30 x  HEP   Bridges 15 x  HEP   SAQ / LAQ 3#  X 20/  3#   x20 HEP         SB heel slides  20x  . Supine march X 10 each    Standing HS   Left only 2 x10    Standing march left only 3#   2 x10     Mini Squats 2 x 10     HR/ TL x10     Standing left hip flex X 10     Seated Hip flex 2 x 10     Seated ankle circles with knee ext 2 x 10          Manual Intervention     STM to adductors, quad , hip flexor  MHP x 5 min prior to massage   Hip PROM X 3 min    Supine Hip flex stretch 3 min. Man HS  3 min              NMR re-education                                                      Therapeutic Exercise and NMR EXR  [x] (87059) Provided verbal/tactile cueing for activities related to strengthening, flexibility, endurance, ROM for improvements in LE, proximal hip, and core control with self care, mobility, lifting, ambulation.  [] (44906) Provided verbal/tactile cueing for activities related to improving balance, coordination, kinesthetic sense, posture, motor skill, proprioception  to assist with LE, proximal hip, and core control in self care, mobility, lifting, ambulation and eccentric single leg control.      NMR and Therapeutic Activities:    [x] (39252 or 27538) Provided verbal/tactile cueing for activities related to improving balance, coordination, kinesthetic sense, posture, motor skill, proprioception and motor activation to allow for proper function of core, proximal hip and LE with self care and ADLs  [] (00780) Gait Re-education- Provided training and instruction to the patient for proper LE, core and proximal hip recruitment and positioning and eccentric body weight control with ambulation re-education including up and down stairs     Home Exercise Program:    [x] (77223) Reviewed/Progressed HEP activities related to strengthening, flexibility, endurance, ROM of core, proximal hip and LE for functional self-care, mobility, lifting and ambulation/stair navigation   [] (63325)Reviewed/Progressed HEP activities related to improving balance, coordination, kinesthetic sense, posture, motor skill, proprioception of core, proximal hip and LE for self care, mobility, lifting, and ambulation/stair navigation      Manual Treatments:  PROM / STM / Oscillations-Mobs:  G-I, II, III, IV (PA's, Inf., Post.)  [x] (12653) Provided manual therapy to mobilize LE, proximal hip and/or LS spine soft tissue/joints for the purpose of modulating pain, promoting relaxation,  increasing ROM, reducing/eliminating soft tissue swelling/inflammation/restriction, improving soft tissue extensibility and allowing for proper ROM for normal function with self care, mobility, lifting and ambulation. Modalities:   Declined, ice at home    Charges:  Timed Code Treatment Minutes: 45   Total Treatment Minutes: 45     [] EVAL (LOW) 03778 (typically 20 minutes face-to-face)  [] EVAL (MOD) 31539 (typically 30 minutes face-to-face)  [] EVAL (HIGH) 59220 (typically 45 minutes face-to-face)  [] RE-EVAL     [x] XI(54782) x  2   [] IONTO  [] NMR (83074) x      [] VASO  [x] Manual (82231) x  1    [] Other:  [] TA x       [] Mech Traction (07443)  [] ES(attended) (24216)      [] ES (un) (71000):     GOALS: Patient stated goal: Ambulate with cane and be prepared to leave for [place] (mid)     Therapist goals for Patient:   Short Term Goals: To be achieved in: 2 weeks  1. Independent in HEP and progression per patient tolerance, in order to prevent re-injury. 2. Patient will have a decrease in pain to facilitate improvement in movement, function, and ADLs as indicated by Functional Deficits.     Long Term Goals:  To be achieved in: 6-8 weeks (hoping x 4 weeks to leave for [place])  1. Disability index score of 38% or less for the LEFS to assist with reaching prior level of function. 2. Patient will demonstrate increased AROM to 40 deg hip ABD to allow for proper joint functioning as indicated by patients Functional Deficits. 3. Patient will demonstrate an increase in Strength to 3+/5 hip flexion LE to allow for proper functional mobility as indicated by patients Functional Deficits. 4. Patient will return to independent in car transfers and bed mobility without UE support to return to  functional activities.       New or Updated Goals (if applicable):  [x] No change to goals established upon initial eval/last progress note:  New Goals:    Progression Towards Functional goals:   [] Patient is progressing as expected towards functional goals listed. [] Progression is slowed due to complexities listed. [] Progression has been slowed due to co-morbidities.   [x] Plan just implemented, too soon to assess goals progression  [] Other:     ASSESSMENT:    [] Improvement noted relative to goals:  [] No Improvement noted related to goals:  Summary/Patient's response to treatment: See Eval    Treatment/Activity Tolerance:  [x] Patient tolerated treatment well [] Patient limited by fatique  [] Patient limited by pain  [] Patient limited by other medical complications  [] Other:     Prognosis: [x] Good [] Fair  [] Poor    Patient Requires Follow-up: [x] Yes  [] No    PLAN: See eval - Patient hoping to leave for [place] mid January  [x] Continue per plan of care [] Alter current plan (see comments)  [] Plan of care initiated [] Hold pending MD visit [] Discharge    Electronically signed by: Kamila Hoyte [address]

## 2018-07-10 PROBLEM — N18.4 CKD STAGE 4 DUE TO TYPE 2 DIABETES MELLITUS (HCC): Status: ACTIVE | Noted: 2018-01-01

## 2018-07-10 PROBLEM — E11.22 CKD STAGE 4 DUE TO TYPE 2 DIABETES MELLITUS (HCC): Status: ACTIVE | Noted: 2018-01-01

## 2018-07-10 NOTE — PROGRESS NOTES
Subjective:      Patient ID: Maura Knight is a 69 y.o. male. HPI   69 y.o.  male with known IDDM, htn, and chronic back pain , diastolic CHF , CKD here for regular f/w     Since last time, pt taking  increased metolozone to 5 times a week , still with dizziness but not as bad. Remains active with mowing business     HTN- renovascular HTN - with  bilateral renal stents placed in 6/16 with some BP improvement    Remains on multiple meds , clonidine patch caused rash  And now on oral clonidine  Managed by Dr. Torie Santa -Continued lotensin, coreg, hydralazine, norvasc, hytrin, demadex         DM-   No low sugars , sugars remain controlled at 150 fasting, last A1c at 9.5 .taking amaryl, novolin 25 units bid     Diabetic neuropathy - stable  Does have chronic  tingling and numbness in feet. Uses gabapentin     Gout - well controlled - no recent flare ups    Seen Dr. Lory lCark for abnormal kappa chains and NM  Bone marrow,  bone scan neg       Review of Systems   Constitutional: Negative for activity change, fever and unexpected weight change. HENT: Negative for ear discharge, hearing loss, postnasal drip and rhinorrhea. Respiratory: Negative for cough, chest tightness and shortness of breath. Cardiovascular: Positive for leg swelling. Negative for chest pain and palpitations. Endocrine: Positive for polyuria. Negative for cold intolerance. Genitourinary: Negative for frequency, hematuria and testicular pain. Musculoskeletal: Positive for arthralgias and back pain. Negative for gait problem, myalgias and neck stiffness. Skin: Negative for color change. Neurological: Negative for weakness and light-headedness. Hematological: Negative for adenopathy. Psychiatric/Behavioral: Negative for agitation, behavioral problems and sleep disturbance.             There are no changes to past medical history, family history, social history or review of systems(except as noted in the history section) since prior note (all mellitus with other diabetic kidney complication, with long-term current use of insulin ([facility] [address]     2. Chronic diastolic congestive heart failure ([facility] [address]     3. Gastroesophageal reflux disease without esophagitis     4. Coronary artery disease involving native coronary artery of native heart without angina pectoris     5. CKD stage 4 due to type 2 diabetes mellitus ([facility] [address]            Plan:         IDDM-with renal complications -improved with novolin 25 units bid  Off metformin   Need A1c , has not done labs yet- labs today  Continue amaryl 4 mg daily. Low carb diet  On ARB    [place] vascular Hypertension- improved remarkably with bilateral renal arterial stenting ( 6/28/16)   Continue lotensin 40mg daily, terazosin 4 mg. Clonidine  bid, coreg 12.5 mg bid, norvasc 10 mg, Torsemide 40 mg daily , hydralazine 100 tid  Managed by nephrology    Also on metalazone 5 times a week     CKD - stage4  - sec to HTN and diabetes. F/w nephrology . Stable recent labs  F/w Dr. Mickey Rodríguez    Lung nodule  - stable since 2013   F/w Dr Rosario, recent ct with stable nodules , some ILd changes    CAROLINA - on home CPAP , very compliant    Chronic back pain- Vicodin 10/325mg q bid PRN pain-       Peripheral Neuropathy- diabetic Continue neurotin 300mg TID     Chronic aortic dissection- no active symptoms- need BP control     Pancreatic head mass- workup neg per pt- no symptoms      H.o fall with subdural hematoma remotely    Prostate cancer - s/p resection, back on  lupron shots    MGUS - neg workup. f w Dr. Romayne Primes    Anemia, related to CKD - on procrit now by hematology      Blood work with Dr. Mickey Rodríguez- need A1C - not done since one year

## 2018-07-12 NOTE — TELEPHONE ENCOUNTER
----- Message from Reanna Laws MD sent at 7/12/2018  4:32 PM EDT -----  yunior not sure if urology will do or not  See urology and see what they say  ----- Message -----  From: Fam Fontaine  Sent: 7/12/2018   4:19 PM  To: Reanna Laws MD    Pt was informed PSA high and to see Urologist for biopsy, pt wanting to know what he is having a biopsy on? Please advise.

## 2018-07-13 NOTE — TELEPHONE ENCOUNTER
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

## 2018-07-13 NOTE — TELEPHONE ENCOUNTER
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

## 2018-08-02 NOTE — ED NOTES
EMS arrived to ED room 2 @ 1925. Pt unresponsive, Ubaldo Carrel in place. EMS state initial call was @ [phone]. Pt found unresponsive in truck in driveway by neighbor who placed call to EMS. Truck not running, amount of time down unknown, identity and medical history initially unknown. I-gel in place. Asystole on monitor. 1928 - 1mg epinephrine given     1929 - IO inserted in [address] check, asystole    1932 - Rhythm check, asystole. 1 amp bicarb given. 1933 - 1mg epinephrine given    1934 - Rhythm check, asystole    1936 - Rhythm check, asystole. Time of death declared, Dr. Leonardo Luo.          Shaun Robertson, RN  08/02/18 9174

## 2018-08-03 NOTE — ED NOTES
Kenyatta Pump and PCP notified.   See checklist for details     Navjot Burroughs RN  08/02/18 2039       Navjot Burroughs RN  08/02/18 2040

## 2018-08-03 NOTE — ED NOTES
Post mortem care completed and pt placed in post mortem bag.   Security called to bring damien Aquino RN  08/03/18 3492

## 2018-08-03 NOTE — ED NOTES
Pt's family and Preeti Leal at bedside     Shira Gonzalez, [facility]  08/02/18 2033

## 2018-08-04 NOTE — ED PROVIDER NOTES
extremities  Heart: no peripheral pulse, or cardiac activity  Perfusion:  cool extremities, delayed cap refill  Respiratory:  no spontaneous respirations, equal breath sounds with bagging via iGel  Abdominal:  Non distended. no signs of trauma  Neurologic:  Unresponsive without response to pain in extremities    I have reviewed and interpreted all of the currently available lab results from this visit (if applicable):  No results found for this visit on 08/02/18. Radiographs (if obtained):  [] The following radiograph was interpreted by myself in the absence of a radiologist:   [] Radiologist's Report Reviewed:      Chart review shows recent radiographs:  No results found. MDM:  Patient was kept on the GHAZALA device. Two IO lines were placed under my direct supervision. Patient was given multiple rounds of ACLS medications. He remained in asystole his entire resuscitation. Patient had excellent CPR from the GHAZALA, with end-tidal capnography remaining in the 20's. After multiple rounds of ACLS, patient remains in asystole. Given that this was an unwitnessed asystolic arrest, he has no chance of survival at this point. Resuscitation was ceased. Family arrived a short time thereafter. I have answered their questions to the best of my abilities. Nursing verified with the  that this is not a coroners case. I discussed the case with his primary care physician, Dr Edna Soria. He has agreed to sign the death certificate. Clinical Impression:  1. Sudden cardiac death [facility])      Total critical care time today provided was 37 minutes. This excludes seperately billable procedures and family discussion time. Provided for cardiac arrest requiring intervention with concern for potential decompensation.       Comment: Please note this report has been produced using speech recognition software and may contain errors related to that system including errors in grammar, punctuation, and spelling, as well as words and phrases that may be inappropriate. If there are any questions or concerns please feel free to contact the dictating provider for clarification.         Ok Jaimes MD  08/03/18 0757

## 2019-06-10 ENCOUNTER — TELEPHONE (OUTPATIENT)
Dept: PULMONOLOGY | Age: 79
End: 2019-06-10

## 2019-06-14 ENCOUNTER — TELEPHONE (OUTPATIENT)
Dept: PULMONOLOGY | Age: 79
End: 2019-06-14

## 2019-06-14 NOTE — TELEPHONE ENCOUNTER
Daughter called office to say that her father passed away on 8/3/18. LOV: 6/11/18  Assessment:       · Severe CAROLINA-Optimal compliance and efficacy on review today (other than couldn't use d/t skin removal from face). · Obesity  · Pulmonary- followed by Dr. Jeronimo Jiang  · Hypertension-blood pressure elevated in office today        Plan:       -Continue CPAP 11 cm H2O  - Advised to use CPAP 6-8 hrs at night and during naps- restart CPAP if okay per dermatology. -Mask fitting in office with RT  - Replacement of mask, tubing, head straps every 3-6 months or sooner if damaged. - Patient instructed to contact [employer] for any mask, tubing or machine trouble shooting if problems arise.  - Sleep hygiene  - Avoid sedatives, alcohol and caffeinated drinks at bed time. - Patient counseled to never drive or operate heavy machinery while fatigue, drowsy or sleepy.    - Weight loss is recommended as a long-term intervention.    - Complications of CAROLINA if not treated were discussed with patient patient, including: systemic hypertension, pulmonary hypertension, cardiovascular morbidities, car accidents and all cause mortality.  -Patient education handout provided regarding sleep tips and CPAP cleaning recommendations   -Follow-up with PCP for elevated blood pressure     Follow-up sleep one year, sooner if needed  Follow-up with Dr. Eric Sorto as scheduled

## 2020-12-21 NOTE — FLOWSHEET NOTE
[facility], [address] [place] [address]  Phone: [phone]  Fax [phone]    Physical Therapy Daily Treatment Note  Date:  2017    Patient Name:  Joao Gonzalez    :  1940  MRN: [id number]  Restrictions/Precautions:    Physician Information:  Referring Practitioner: Dr. Nuzhat Moura  Medical/Treatment Diagnosis Information:  · Diagnosis: Left Hip closed Intertrochanteric fx s/p IM nailing  10/10/17  · Treatment Diagnosis: Left Hip Pain (M25.552) / Difficulty Walking (R26.2)     [] Conservative / [] Surgical - DOS:  Therapy Diagnosis/Practice Pattern:  Practice Pattern I: Bony or Soft Tissue Surgery  Insurance/Certification information:  PT Insurance Information: [employer]  - $40CP - $0DED - PT/OT MED NEC - 100% - NEEDS AUTH  Plan of care signed: [] YES  [x] NO  Number of Comorbidities:  []0     []1-2    [x]3+  Date of Patient follow up with Physician: 17    G-Code (if applicable):      Date G-Code Applied:  17  PT G-Codes  Functional Assessment Tool Used: LEFS  Score: 45%  Functional Limitation: Mobility: Walking and moving around  Mobility: Walking and Moving Around Current Status (): At least 40 percent but less than 60 percent impaired, limited or restricted  Mobility: Walking and Moving Around Goal Status (): At least 20 percent but less than 40 percent impaired, limited or restricted    Progress Note: [x]  Yes  []  No  Next due by: Visit #10        Latex Allergy:  [x]NO      []YES  Preferred Language for Healthcare:   [x]English       []other:    Visit # Insurance Allowable Reporting Period   3 BMN (needs auth) Begin Date: 2017               End Date:      RECERT DUE BY: 71 (8 weeks)    SUBJECTIVE:   Pt states that his hip feels great. Pt says just superior to his knee he feels tightness. Pt is compliant with walker use.     OBJECTIVE:  Observation:  Palpation:     Test used Initial score
Never smoker